# Patient Record
Sex: MALE | Race: WHITE | NOT HISPANIC OR LATINO | Employment: FULL TIME | URBAN - METROPOLITAN AREA
[De-identification: names, ages, dates, MRNs, and addresses within clinical notes are randomized per-mention and may not be internally consistent; named-entity substitution may affect disease eponyms.]

---

## 2023-03-13 ENCOUNTER — TELEPHONE (OUTPATIENT)
Dept: PRIMARY CARE | Facility: CLINIC | Age: 53
End: 2023-03-13
Payer: COMMERCIAL

## 2023-03-13 DIAGNOSIS — F41.9 ANXIETY: Primary | ICD-10-CM

## 2023-03-13 RX ORDER — LORAZEPAM 0.5 MG/1
0.5 TABLET ORAL EVERY 6 HOURS PRN
Qty: 4 TABLET | Refills: 0 | Status: SHIPPED | OUTPATIENT
Start: 2023-03-13 | End: 2023-05-10 | Stop reason: ALTCHOICE

## 2023-03-13 NOTE — TELEPHONE ENCOUNTER
Pt left  stating he is going on airplane.  Has not travelled on airplane recently.  Requesting Rx for fear of flying.  Has taken Ativan in past. Requesting to [facility].

## 2023-05-09 PROBLEM — R49.0 DYSPHONIA: Status: ACTIVE | Noted: 2023-05-09

## 2023-05-09 PROBLEM — K63.5 COLON POLYPS: Status: ACTIVE | Noted: 2023-05-09

## 2023-05-09 PROBLEM — J32.0 CHRONIC MAXILLARY SINUSITIS: Status: ACTIVE | Noted: 2023-05-09

## 2023-05-09 PROBLEM — E78.5 HYPERLIPIDEMIA: Status: ACTIVE | Noted: 2023-05-09

## 2023-05-09 PROBLEM — S43.80XA DETACHMENT OF GLENOID LABRUM: Status: ACTIVE | Noted: 2023-05-09

## 2023-05-09 PROBLEM — S13.9XXA CERVICAL SPRAIN: Status: ACTIVE | Noted: 2023-05-09

## 2023-05-09 PROBLEM — A05.9 FOOD POISONING: Status: ACTIVE | Noted: 2023-05-09

## 2023-05-09 PROBLEM — K59.09 CHRONIC CONSTIPATION: Status: ACTIVE | Noted: 2023-05-09

## 2023-05-09 PROBLEM — J04.0 REFLUX LARYNGITIS: Status: ACTIVE | Noted: 2023-05-09

## 2023-05-09 PROBLEM — Z90.49 S/P CHOLECYSTECTOMY: Status: ACTIVE | Noted: 2023-05-09

## 2023-05-09 PROBLEM — K21.9 REFLUX LARYNGITIS: Status: ACTIVE | Noted: 2023-05-09

## 2023-05-09 PROBLEM — R94.31 ABNORMAL EKG: Status: ACTIVE | Noted: 2023-05-09

## 2023-05-09 PROBLEM — F43.0 ACUTE STRESS REACTION: Status: ACTIVE | Noted: 2023-05-09

## 2023-05-09 PROBLEM — G47.9 SLEEP DIFFICULTIES: Status: ACTIVE | Noted: 2023-05-09

## 2023-05-09 PROBLEM — L25.9 CONTACT DERMATITIS: Status: ACTIVE | Noted: 2023-05-09

## 2023-05-09 PROBLEM — J45.909 RAD (REACTIVE AIRWAY DISEASE) (HHS-HCC): Status: ACTIVE | Noted: 2023-05-09

## 2023-05-09 PROBLEM — R51.9 FREQUENT HEADACHES: Status: ACTIVE | Noted: 2023-05-09

## 2023-05-09 PROBLEM — J38.00 VOCAL CORD WEAKNESS: Status: ACTIVE | Noted: 2023-05-09

## 2023-05-09 PROBLEM — M25.511 BILATERAL SHOULDER PAIN: Status: ACTIVE | Noted: 2023-05-09

## 2023-05-09 PROBLEM — F41.9 ANXIETY: Status: ACTIVE | Noted: 2023-05-09

## 2023-05-09 PROBLEM — K21.9 GASTROESOPHAGEAL REFLUX DISEASE WITHOUT ESOPHAGITIS: Status: ACTIVE | Noted: 2023-05-09

## 2023-05-09 PROBLEM — K29.70 GASTRITIS: Status: ACTIVE | Noted: 2023-05-09

## 2023-05-09 PROBLEM — M25.512 BILATERAL SHOULDER PAIN: Status: ACTIVE | Noted: 2023-05-09

## 2023-05-09 PROBLEM — R44.8 FACIAL PRESSURE: Status: ACTIVE | Noted: 2023-05-09

## 2023-05-09 RX ORDER — ESCITALOPRAM OXALATE 5 MG/1
1 TABLET ORAL DAILY
COMMUNITY
Start: 2022-11-18 | End: 2023-05-10

## 2023-05-09 RX ORDER — PANTOPRAZOLE SODIUM 40 MG/1
FOR SUSPENSION ORAL
COMMUNITY
Start: 2022-04-09 | End: 2023-05-10 | Stop reason: ALTCHOICE

## 2023-05-09 RX ORDER — LEVOCETIRIZINE DIHYDROCHLORIDE 5 MG/1
5 TABLET, FILM COATED ORAL EVERY EVENING
COMMUNITY
End: 2023-05-10 | Stop reason: ALTCHOICE

## 2023-05-09 RX ORDER — OMEPRAZOLE 40 MG/1
CAPSULE, DELAYED RELEASE ORAL
COMMUNITY
Start: 2022-06-27 | End: 2023-05-10 | Stop reason: SDUPTHER

## 2023-05-09 RX ORDER — FLUTICASONE PROPIONATE 50 MCG
2 SPRAY, SUSPENSION (ML) NASAL 2 TIMES DAILY
COMMUNITY
Start: 2020-02-25 | End: 2023-05-10 | Stop reason: SDUPTHER

## 2023-05-09 RX ORDER — FLUTICASONE PROPIONATE AND SALMETEROL 250; 50 UG/1; UG/1
1 POWDER RESPIRATORY (INHALATION) EVERY 12 HOURS
COMMUNITY
Start: 2022-05-19 | End: 2023-05-10 | Stop reason: ALTCHOICE

## 2023-05-10 ENCOUNTER — OFFICE VISIT (OUTPATIENT)
Dept: PRIMARY CARE | Facility: CLINIC | Age: 53
End: 2023-05-10
Payer: COMMERCIAL

## 2023-05-10 VITALS
TEMPERATURE: 98.7 F | BODY MASS INDEX: 26.36 KG/M2 | HEIGHT: 75 IN | DIASTOLIC BLOOD PRESSURE: 80 MMHG | WEIGHT: 212 LBS | RESPIRATION RATE: 14 BRPM | HEART RATE: 78 BPM | OXYGEN SATURATION: 98 % | SYSTOLIC BLOOD PRESSURE: 128 MMHG

## 2023-05-10 DIAGNOSIS — Z00.00 PERIODIC HEALTH ASSESSMENT, GENERAL SCREENING, ADULT: Primary | ICD-10-CM

## 2023-05-10 DIAGNOSIS — J31.0 RHINITIS, UNSPECIFIED TYPE: ICD-10-CM

## 2023-05-10 DIAGNOSIS — K21.9 GASTROESOPHAGEAL REFLUX DISEASE WITHOUT ESOPHAGITIS: ICD-10-CM

## 2023-05-10 PROCEDURE — 99396 PREV VISIT EST AGE 40-64: CPT | Performed by: INTERNAL MEDICINE

## 2023-05-10 PROCEDURE — 1036F TOBACCO NON-USER: CPT | Performed by: INTERNAL MEDICINE

## 2023-05-10 RX ORDER — ESCITALOPRAM OXALATE 5 MG/1
5 TABLET ORAL DAILY
Qty: 90 TABLET | Refills: 3 | Status: CANCELLED | OUTPATIENT
Start: 2023-05-10 | End: 2024-05-09

## 2023-05-10 RX ORDER — OMEPRAZOLE 40 MG/1
40 CAPSULE, DELAYED RELEASE ORAL
Qty: 90 CAPSULE | Refills: 3 | Status: SHIPPED | OUTPATIENT
Start: 2023-05-10 | End: 2023-11-15 | Stop reason: SDUPTHER

## 2023-05-10 RX ORDER — ESCITALOPRAM OXALATE 10 MG/1
10 TABLET ORAL DAILY
Qty: 30 TABLET | Refills: 11 | Status: SHIPPED | OUTPATIENT
Start: 2023-05-10 | End: 2023-06-06

## 2023-05-10 RX ORDER — FLUTICASONE PROPIONATE 50 MCG
2 SPRAY, SUSPENSION (ML) NASAL 2 TIMES DAILY
Qty: 16 G | Refills: 3 | Status: SHIPPED | OUTPATIENT
Start: 2023-05-10 | End: 2023-11-15 | Stop reason: SDUPTHER

## 2023-05-10 ASSESSMENT — ENCOUNTER SYMPTOMS
PALPITATIONS: 0
DIARRHEA: 0
SHORTNESS OF BREATH: 0
COUGH: 0
CONSTIPATION: 0
WHEEZING: 0

## 2023-06-04 DIAGNOSIS — Z00.00 PERIODIC HEALTH ASSESSMENT, GENERAL SCREENING, ADULT: ICD-10-CM

## 2023-06-06 RX ORDER — ESCITALOPRAM OXALATE 10 MG/1
TABLET ORAL
Qty: 30 TABLET | Refills: 11 | Status: SHIPPED | OUTPATIENT
Start: 2023-06-06 | End: 2023-11-15 | Stop reason: ALTCHOICE

## 2023-08-24 ENCOUNTER — OFFICE VISIT (OUTPATIENT)
Dept: PRIMARY CARE | Facility: CLINIC | Age: 53
End: 2023-08-24
Payer: COMMERCIAL

## 2023-08-24 VITALS
SYSTOLIC BLOOD PRESSURE: 118 MMHG | BODY MASS INDEX: 26.62 KG/M2 | WEIGHT: 213 LBS | OXYGEN SATURATION: 96 % | RESPIRATION RATE: 17 BRPM | TEMPERATURE: 98.2 F | HEART RATE: 60 BPM | DIASTOLIC BLOOD PRESSURE: 76 MMHG

## 2023-08-24 DIAGNOSIS — R10.9 ABDOMINAL PAIN IN MALE: Primary | ICD-10-CM

## 2023-08-24 LAB
POC APPEARANCE, URINE: CLEAR
POC BILIRUBIN, URINE: NEGATIVE
POC BLOOD, URINE: ABNORMAL
POC COLOR, URINE: YELLOW
POC GLUCOSE, URINE: NEGATIVE MG/DL
POC KETONES, URINE: NEGATIVE MG/DL
POC LEUKOCYTES, URINE: NEGATIVE
POC NITRITE,URINE: NEGATIVE
POC PH, URINE: 6 PH
POC PROTEIN, URINE: ABNORMAL MG/DL
POC SPECIFIC GRAVITY, URINE: 1.02
POC UROBILINOGEN, URINE: 0.2 EU/DL

## 2023-08-24 PROCEDURE — 99214 OFFICE O/P EST MOD 30 MIN: CPT | Performed by: FAMILY MEDICINE

## 2023-08-24 PROCEDURE — 1036F TOBACCO NON-USER: CPT | Performed by: FAMILY MEDICINE

## 2023-08-24 PROCEDURE — 87086 URINE CULTURE/COLONY COUNT: CPT

## 2023-08-24 PROCEDURE — 81002 URINALYSIS NONAUTO W/O SCOPE: CPT | Performed by: FAMILY MEDICINE

## 2023-08-24 RX ORDER — CIPROFLOXACIN 500 MG/1
500 TABLET ORAL 2 TIMES DAILY
Qty: 20 TABLET | Refills: 0 | Status: SHIPPED | OUTPATIENT
Start: 2023-08-24 | End: 2023-09-03

## 2023-08-24 ASSESSMENT — ENCOUNTER SYMPTOMS
HEMATURIA: 0
SHORTNESS OF BREATH: 0
COUGH: 0
NAUSEA: 1
CONSTIPATION: 0
ABDOMINAL PAIN: 1
VOMITING: 1
FEVER: 0
MYALGIAS: 0
DYSURIA: 0
DIARRHEA: 0
FREQUENCY: 1
FLANK PAIN: 0
WHEEZING: 0

## 2023-08-24 NOTE — PROGRESS NOTES
Subjective   Patient ID: Dario Buckley is a 53 y.o. male who presents for Abdominal Pain.    Abdominal Pain  This is a new problem. The current episode started today. The problem has been unchanged. The pain is located in the suprapubic region and generalized abdominal region. The pain is at a severity of 5/10. Associated symptoms include frequency, nausea and vomiting. Pertinent negatives include no constipation, diarrhea, dysuria, fever, hematuria or myalgias.        Review of Systems   Constitutional:  Negative for fever.   HENT: Negative.     Respiratory:  Negative for cough, shortness of breath and wheezing.    Gastrointestinal:  Positive for abdominal pain, nausea and vomiting. Negative for constipation and diarrhea.   Genitourinary:  Positive for frequency. Negative for dysuria, flank pain, hematuria, scrotal swelling and testicular pain.        Hx of kidney stone   Symptoms today. Lower abd pain    Musculoskeletal:  Negative for myalgias.       Objective   /76 (BP Location: Left arm, Patient Position: Sitting, BP Cuff Size: Large adult)   Pulse 60   Temp 36.8 °C (98.2 °F)   Resp 17   Wt 96.6 kg (213 lb)   SpO2 96%   BMI 26.62 kg/m²     Physical Exam  Vitals and nursing note reviewed.   Constitutional:       General: He is not in acute distress.     Appearance: Normal appearance.   HENT:      Head: Normocephalic and atraumatic.   Cardiovascular:      Rate and Rhythm: Normal rate and regular rhythm.      Heart sounds: Normal heart sounds.   Pulmonary:      Effort: Pulmonary effort is normal.      Breath sounds: Normal breath sounds.   Abdominal:      General: Abdomen is flat. Bowel sounds are normal.      Palpations: Abdomen is soft. There is no mass.      Tenderness: There is abdominal tenderness. There is no right CVA tenderness, left CVA tenderness, guarding or rebound.      Comments: Suprapubic tenderness   Skin:     General: Skin is warm and dry.   Neurological:      Mental Status: He is  alert.         Assessment/Plan   Problem List Items Addressed This Visit       Abdominal pain in male - Primary     Pt with lower abd pain and hx of kidney stones  Ua is abn, will send uc and tx with cipro  Take atbx as directed  Increase flds  Will get renal sonogram asap  F/up with pcp if no improvement         Relevant Medications    ciprofloxacin (Cipro) 500 mg tablet    Other Relevant Orders    POCT UA (nonautomated) manually resulted (Completed)    Urine Culture    US renal complete

## 2023-08-24 NOTE — ASSESSMENT & PLAN NOTE
Pt with lower abd pain and hx of kidney stones  Ua is abn, will send uc and tx with cipro  Take atbx as directed  Increase flds  Will get renal sonogram asap  F/up with pcp if no improvement

## 2023-08-25 LAB — URINE CULTURE: NORMAL

## 2023-10-23 ENCOUNTER — TELEPHONE (OUTPATIENT)
Dept: PRIMARY CARE | Facility: CLINIC | Age: 53
End: 2023-10-23
Payer: COMMERCIAL

## 2023-10-23 DIAGNOSIS — Z12.5 SCREENING FOR PROSTATE CANCER: Primary | ICD-10-CM

## 2023-10-23 DIAGNOSIS — Z00.00 PERIODIC HEALTH ASSESSMENT, GENERAL SCREENING, ADULT: ICD-10-CM

## 2023-11-11 ENCOUNTER — LAB (OUTPATIENT)
Dept: LAB | Facility: LAB | Age: 53
End: 2023-11-11
Payer: COMMERCIAL

## 2023-11-11 DIAGNOSIS — Z00.00 PERIODIC HEALTH ASSESSMENT, GENERAL SCREENING, ADULT: ICD-10-CM

## 2023-11-11 DIAGNOSIS — Z12.5 SCREENING FOR PROSTATE CANCER: ICD-10-CM

## 2023-11-11 LAB
ALBUMIN SERPL BCP-MCNC: 4.3 G/DL (ref 3.4–5)
ALP SERPL-CCNC: 54 U/L (ref 33–120)
ALT SERPL W P-5'-P-CCNC: 51 U/L (ref 10–52)
ANION GAP SERPL CALC-SCNC: 12 MMOL/L (ref 10–20)
AST SERPL W P-5'-P-CCNC: 38 U/L (ref 9–39)
BILIRUB SERPL-MCNC: 1.3 MG/DL (ref 0–1.2)
BUN SERPL-MCNC: 20 MG/DL (ref 6–23)
CALCIUM SERPL-MCNC: 9.4 MG/DL (ref 8.6–10.3)
CHLORIDE SERPL-SCNC: 105 MMOL/L (ref 98–107)
CHOLEST SERPL-MCNC: 269 MG/DL (ref 0–199)
CHOLESTEROL/HDL RATIO: 5.1
CO2 SERPL-SCNC: 29 MMOL/L (ref 21–32)
CREAT SERPL-MCNC: 1.06 MG/DL (ref 0.5–1.3)
ERYTHROCYTE [DISTWIDTH] IN BLOOD BY AUTOMATED COUNT: 13 % (ref 11.5–14.5)
GFR SERPL CREATININE-BSD FRML MDRD: 83 ML/MIN/1.73M*2
GLUCOSE SERPL-MCNC: 91 MG/DL (ref 74–99)
HCT VFR BLD AUTO: 45.1 % (ref 41–52)
HDLC SERPL-MCNC: 52.9 MG/DL
HGB BLD-MCNC: 15.3 G/DL (ref 13.5–17.5)
LDLC SERPL CALC-MCNC: 185 MG/DL
MCH RBC QN AUTO: 33.7 PG (ref 26–34)
MCHC RBC AUTO-ENTMCNC: 33.9 G/DL (ref 32–36)
MCV RBC AUTO: 99 FL (ref 80–100)
NON HDL CHOLESTEROL: 216 MG/DL (ref 0–149)
NRBC BLD-RTO: 0 /100 WBCS (ref 0–0)
PLATELET # BLD AUTO: 202 X10*3/UL (ref 150–450)
POTASSIUM SERPL-SCNC: 4 MMOL/L (ref 3.5–5.3)
PROT SERPL-MCNC: 7.1 G/DL (ref 6.4–8.2)
PSA SERPL-MCNC: 1.49 NG/ML
RBC # BLD AUTO: 4.54 X10*6/UL (ref 4.5–5.9)
SODIUM SERPL-SCNC: 142 MMOL/L (ref 136–145)
TRIGL SERPL-MCNC: 156 MG/DL (ref 0–149)
TSH SERPL-ACNC: 1.19 MIU/L (ref 0.44–3.98)
VLDL: 31 MG/DL (ref 0–40)
WBC # BLD AUTO: 5.8 X10*3/UL (ref 4.4–11.3)

## 2023-11-11 PROCEDURE — 80061 LIPID PANEL: CPT

## 2023-11-11 PROCEDURE — 36415 COLL VENOUS BLD VENIPUNCTURE: CPT

## 2023-11-11 PROCEDURE — 84402 ASSAY OF FREE TESTOSTERONE: CPT

## 2023-11-11 PROCEDURE — 84153 ASSAY OF PSA TOTAL: CPT

## 2023-11-11 PROCEDURE — 85027 COMPLETE CBC AUTOMATED: CPT

## 2023-11-11 PROCEDURE — 80053 COMPREHEN METABOLIC PANEL: CPT

## 2023-11-11 PROCEDURE — 84443 ASSAY THYROID STIM HORMONE: CPT

## 2023-11-15 ENCOUNTER — OFFICE VISIT (OUTPATIENT)
Dept: PRIMARY CARE | Facility: CLINIC | Age: 53
End: 2023-11-15
Payer: COMMERCIAL

## 2023-11-15 VITALS
HEART RATE: 77 BPM | TEMPERATURE: 98.8 F | WEIGHT: 226 LBS | HEIGHT: 75 IN | BODY MASS INDEX: 28.1 KG/M2 | OXYGEN SATURATION: 94 % | RESPIRATION RATE: 14 BRPM | SYSTOLIC BLOOD PRESSURE: 110 MMHG | DIASTOLIC BLOOD PRESSURE: 60 MMHG

## 2023-11-15 DIAGNOSIS — Z00.00 PERIODIC HEALTH ASSESSMENT, GENERAL SCREENING, ADULT: ICD-10-CM

## 2023-11-15 DIAGNOSIS — E78.00 PURE HYPERCHOLESTEROLEMIA: Primary | ICD-10-CM

## 2023-11-15 DIAGNOSIS — F41.9 ANXIETY: ICD-10-CM

## 2023-11-15 DIAGNOSIS — J31.0 RHINITIS, UNSPECIFIED TYPE: ICD-10-CM

## 2023-11-15 DIAGNOSIS — H04.129 DRY EYE: ICD-10-CM

## 2023-11-15 DIAGNOSIS — K21.9 GASTROESOPHAGEAL REFLUX DISEASE WITHOUT ESOPHAGITIS: ICD-10-CM

## 2023-11-15 PROCEDURE — 99213 OFFICE O/P EST LOW 20 MIN: CPT | Performed by: INTERNAL MEDICINE

## 2023-11-15 PROCEDURE — 1036F TOBACCO NON-USER: CPT | Performed by: INTERNAL MEDICINE

## 2023-11-15 RX ORDER — ESCITALOPRAM OXALATE 10 MG/1
10 TABLET ORAL DAILY
Qty: 30 TABLET | Refills: 11 | Status: CANCELLED | OUTPATIENT
Start: 2023-11-15

## 2023-11-15 RX ORDER — EPINASTINE HYDROCHLORIDE 0.5 MG/ML
1 SOLUTION/ DROPS OPHTHALMIC 2 TIMES DAILY
Qty: 15 ML | Refills: 1 | Status: SHIPPED | OUTPATIENT
Start: 2023-11-15 | End: 2024-04-13

## 2023-11-15 RX ORDER — FLUTICASONE PROPIONATE 50 MCG
2 SPRAY, SUSPENSION (ML) NASAL 2 TIMES DAILY
Qty: 48 G | Refills: 1 | Status: SHIPPED | OUTPATIENT
Start: 2023-11-15 | End: 2024-05-09

## 2023-11-15 RX ORDER — FLUTICASONE PROPIONATE 50 MCG
2 SPRAY, SUSPENSION (ML) NASAL 2 TIMES DAILY
Qty: 16 G | Refills: 3 | Status: CANCELLED | OUTPATIENT
Start: 2023-11-15 | End: 2024-11-14

## 2023-11-15 RX ORDER — OMEPRAZOLE 40 MG/1
40 CAPSULE, DELAYED RELEASE ORAL
Qty: 90 CAPSULE | Refills: 3 | Status: SHIPPED | OUTPATIENT
Start: 2023-11-15 | End: 2024-04-29 | Stop reason: SDUPTHER

## 2023-11-15 RX ORDER — ESCITALOPRAM OXALATE 5 MG/1
5 TABLET ORAL DAILY
Qty: 30 TABLET | Refills: 1 | Status: SHIPPED | OUTPATIENT
Start: 2023-11-15 | End: 2024-04-16 | Stop reason: WASHOUT

## 2023-11-15 ASSESSMENT — ENCOUNTER SYMPTOMS
WHEEZING: 0
SHORTNESS OF BREATH: 0
ABDOMINAL PAIN: 0
PALPITATIONS: 0
CONSTIPATION: 0
DIARRHEA: 0
COUGH: 0

## 2023-11-15 NOTE — PROGRESS NOTES
"Subjective   Patient ID: Dario Buckley is a 53 y.o. male who presents for Anxiety.    Overall he has been doing well.  Trying to balance life with school and family etc.  No major issues.  Sleeping ok.    Also some allergy and dry eye symptoms.      Review of Systems   Respiratory:  Negative for cough, shortness of breath and wheezing.    Cardiovascular:  Negative for chest pain and palpitations.   Gastrointestinal:  Negative for abdominal pain, constipation and diarrhea.       Objective   /60 (BP Location: Right arm, Patient Position: Sitting, BP Cuff Size: Adult)   Pulse 77   Temp 37.1 °C (98.8 °F) (Tympanic)   Resp 14   Ht 1.905 m (6' 3\")   Wt 103 kg (226 lb)   SpO2 94%   BMI 28.25 kg/m²     Physical Exam  Vitals reviewed.   Constitutional:       Appearance: Normal appearance.   HENT:      Head: Normocephalic.   Eyes:      Extraocular Movements: Extraocular movements intact.      Conjunctiva/sclera: Conjunctivae normal.      Pupils: Pupils are equal, round, and reactive to light.   Cardiovascular:      Rate and Rhythm: Normal rate.   Pulmonary:      Effort: Pulmonary effort is normal.   Musculoskeletal:         General: Normal range of motion.   Neurological:      General: No focal deficit present.      Mental Status: He is alert.   Psychiatric:         Mood and Affect: Mood normal.         Assessment/Plan   Problem List Items Addressed This Visit             ICD-10-CM    Anxiety F41.9    Relevant Medications    escitalopram (Lexapro) 5 mg tablet    Gastroesophageal reflux disease without esophagitis K21.9    Relevant Medications    omeprazole (PriLOSEC) 40 mg DR capsule    Pure hypercholesterolemia - Primary E78.00     Other Visit Diagnoses         Codes    Periodic health assessment, general screening, adult     Z00.00    Rhinitis, unspecified type     J31.0    Relevant Medications    fluticasone (Flonase) 50 mcg/actuation nasal spray    Dry eye     H04.129    Relevant Medications    epinastine " (Elestat) 0.05 % ophthalmic solution        We reviewed and discussed all of the above.  All questions answered.    We discussed medications as well as importance of healthy diet and regular exercise.    Follow up in 6 months - sooner if any issues or concerns.

## 2023-11-16 LAB
TESTOSTERONE FREE (CHAN): 88.2 PG/ML (ref 35–155)
TESTOSTERONE,TOTAL,LC-MS/MS: 673 NG/DL (ref 250–1100)

## 2024-02-26 ENCOUNTER — OFFICE VISIT (OUTPATIENT)
Dept: PRIMARY CARE | Facility: CLINIC | Age: 54
End: 2024-02-26
Payer: COMMERCIAL

## 2024-02-26 VITALS
DIASTOLIC BLOOD PRESSURE: 77 MMHG | OXYGEN SATURATION: 94 % | BODY MASS INDEX: 30.09 KG/M2 | WEIGHT: 242 LBS | HEART RATE: 79 BPM | HEIGHT: 75 IN | TEMPERATURE: 98 F | RESPIRATION RATE: 18 BRPM | SYSTOLIC BLOOD PRESSURE: 118 MMHG

## 2024-02-26 DIAGNOSIS — H61.21 IMPACTED CERUMEN, RIGHT EAR: ICD-10-CM

## 2024-02-26 DIAGNOSIS — J02.9 SORE THROAT: Primary | ICD-10-CM

## 2024-02-26 PROBLEM — R44.8 FACIAL PRESSURE: Status: RESOLVED | Noted: 2023-05-09 | Resolved: 2024-02-26

## 2024-02-26 PROBLEM — L25.9 CONTACT DERMATITIS: Status: RESOLVED | Noted: 2023-05-09 | Resolved: 2024-02-26

## 2024-02-26 PROBLEM — R10.9 ABDOMINAL PAIN IN MALE: Status: RESOLVED | Noted: 2023-08-24 | Resolved: 2024-02-26

## 2024-02-26 PROBLEM — F43.0 ACUTE STRESS REACTION: Status: RESOLVED | Noted: 2023-05-09 | Resolved: 2024-02-26

## 2024-02-26 PROBLEM — R49.0 DYSPHONIA: Status: RESOLVED | Noted: 2023-05-09 | Resolved: 2024-02-26

## 2024-02-26 PROBLEM — A05.9 FOOD POISONING: Status: RESOLVED | Noted: 2023-05-09 | Resolved: 2024-02-26

## 2024-02-26 PROBLEM — S13.9XXA CERVICAL SPRAIN: Status: RESOLVED | Noted: 2023-05-09 | Resolved: 2024-02-26

## 2024-02-26 LAB — POC RAPID STREP: NEGATIVE

## 2024-02-26 PROCEDURE — 99213 OFFICE O/P EST LOW 20 MIN: CPT

## 2024-02-26 PROCEDURE — 87081 CULTURE SCREEN ONLY: CPT

## 2024-02-26 PROCEDURE — 87880 STREP A ASSAY W/OPTIC: CPT

## 2024-02-26 PROCEDURE — 1036F TOBACCO NON-USER: CPT

## 2024-02-26 ASSESSMENT — ENCOUNTER SYMPTOMS
CHEST TIGHTNESS: 0
SHORTNESS OF BREATH: 0
SORE THROAT: 1
CHILLS: 0
FEVER: 0
PALPITATIONS: 0
RHINORRHEA: 0
COUGH: 0

## 2024-02-26 NOTE — PATIENT INSTRUCTIONS
Motrin 600mg 3 times a day.    Cough drops, vicks, menthol flavor to help with sore throat.  Try hot water and honey.    Will call in 2 days.

## 2024-02-26 NOTE — PROGRESS NOTES
"Subjective   Dario Buckley is a 53 y.o. male who presents for Sore Throat (Sore throat, throat feels raw /Son had strep last week.).    KANDI Bishop is here for 2-3 days of sore throat.     He is a  and son had strep last week.  Son was diagnosed at urgent care. Now with new viral infection.    He is using nasal sprays, nasal rinses. No tylenol or motrin.  Took old amoxicillin, 2 doses.    Tonsillectomy 10 years ago.  Did not take covid test.  No sick symptoms, fever/ chills, ear pain.    Review of Systems   Constitutional:  Negative for chills and fever.   HENT:  Positive for sore throat. Negative for congestion, ear pain and rhinorrhea.    Respiratory:  Negative for cough, chest tightness and shortness of breath.    Cardiovascular:  Negative for chest pain, palpitations and leg swelling.     Objective     /77 (BP Location: Right arm, Patient Position: Sitting)   Pulse 79   Temp 36.7 °C (98 °F)   Resp 18   Ht 1.905 m (6' 3\")   Wt 110 kg (242 lb)   SpO2 94%   BMI 30.25 kg/m²       Physical Exam  Vitals reviewed.   Constitutional:       General: He is not in acute distress.     Appearance: Normal appearance.   HENT:      Head: Normocephalic.      Right Ear: There is impacted cerumen.      Left Ear: Tympanic membrane, ear canal and external ear normal.      Mouth/Throat:      Mouth: Mucous membranes are moist.      Pharynx: Oropharynx is clear. Posterior oropharyngeal erythema (mild) present. No oropharyngeal exudate.   Cardiovascular:      Rate and Rhythm: Normal rate and regular rhythm.      Pulses: Normal pulses.      Heart sounds: Normal heart sounds. No murmur heard.  Pulmonary:      Effort: Pulmonary effort is normal.      Breath sounds: Normal breath sounds. No wheezing, rhonchi or rales.   Musculoskeletal:      Cervical back: Normal range of motion and neck supple.   Lymphadenopathy:      Cervical: No cervical adenopathy.   Skin:     General: Skin is warm and dry.   Neurological:     " Returned pt's phone call regarding an appt question   Mental Status: He is alert.   Psychiatric:         Mood and Affect: Mood normal.       Assessment/Plan   Problem List Items Addressed This Visit    None  Visit Diagnoses         Codes    Sore throat    -  Primary J02.9    Relevant Orders    POCT Rapid Strep A manually resulted (Completed)    Group A Streptococcus, Culture    Impacted cerumen, right ear     H61.21    Relevant Orders    Ear cerumen removal        Dario is here for sore throat. He is not ill appearing, NAD. Pharyngitis: bacterial vs viral. POC strep negative. Back up sent. Symptoms likely viral at this time. Disc symptomatic care, oral hydration.  Impacted right ear: cleaned out today. Recommend not using Qtips for ears.    Discussed with Attending,    Kerrie Vazquez, DO PGY-3

## 2024-02-27 NOTE — PROGRESS NOTES
I reviewed the resident/fellow's documentation and discussed the patient with the resident. I agree with the resident medical decision making as documented in the note.   Patient has had a sore throat for 2 to 3 days.  Minor URI-like symptoms.  His son has URI but he did have strep earlier this month.  Per the resident no deviation, patient had his tonsils removed.  Rapid strep test was negative.  Will send for strep culture.  He is do symptomatic relief.  Patient tolerated the procedure for ear wax removal.  TM was normal per resident posttreatment.  Patient aware if any fever chills any chest pain shortness of breath or trouble swallowing any worsening symptoms any rash, any ear pain decreased hearing any concerning symptoms notify office to go to the ER  Follow-up in 2 weeks if needed  Agree with assessment and plan  Enoc Piper, DO

## 2024-02-29 LAB — S PYO THROAT QL CULT: NORMAL

## 2024-04-16 ENCOUNTER — OFFICE VISIT (OUTPATIENT)
Dept: PRIMARY CARE | Facility: CLINIC | Age: 54
End: 2024-04-16
Payer: COMMERCIAL

## 2024-04-16 VITALS
RESPIRATION RATE: 20 BRPM | BODY MASS INDEX: 29.87 KG/M2 | WEIGHT: 239 LBS | OXYGEN SATURATION: 97 % | HEART RATE: 88 BPM | DIASTOLIC BLOOD PRESSURE: 82 MMHG | TEMPERATURE: 97.8 F | SYSTOLIC BLOOD PRESSURE: 124 MMHG

## 2024-04-16 DIAGNOSIS — J06.9 UPPER RESPIRATORY TRACT INFECTION, UNSPECIFIED TYPE: Primary | ICD-10-CM

## 2024-04-16 LAB
POC RAPID STREP: NEGATIVE
POC SARS-COV-2 AG BINAX: NORMAL

## 2024-04-16 PROCEDURE — 87811 SARS-COV-2 COVID19 W/OPTIC: CPT | Performed by: NURSE PRACTITIONER

## 2024-04-16 PROCEDURE — 1036F TOBACCO NON-USER: CPT | Performed by: NURSE PRACTITIONER

## 2024-04-16 PROCEDURE — 87651 STREP A DNA AMP PROBE: CPT

## 2024-04-16 PROCEDURE — 99213 OFFICE O/P EST LOW 20 MIN: CPT | Performed by: NURSE PRACTITIONER

## 2024-04-16 PROCEDURE — 87636 SARSCOV2 & INF A&B AMP PRB: CPT

## 2024-04-16 PROCEDURE — 87880 STREP A ASSAY W/OPTIC: CPT | Performed by: NURSE PRACTITIONER

## 2024-04-16 RX ORDER — ALBUTEROL SULFATE 90 UG/1
2 AEROSOL, METERED RESPIRATORY (INHALATION) EVERY 4 HOURS PRN
Qty: 8.5 G | Refills: 0 | Status: SHIPPED | OUTPATIENT
Start: 2024-04-16 | End: 2024-05-16

## 2024-04-16 RX ORDER — METHYLPREDNISOLONE 4 MG/1
TABLET ORAL
Qty: 21 TABLET | Refills: 0 | Status: SHIPPED | OUTPATIENT
Start: 2024-04-16 | End: 2024-04-23

## 2024-04-16 RX ORDER — AMOXICILLIN AND CLAVULANATE POTASSIUM 875; 125 MG/1; MG/1
875 TABLET, FILM COATED ORAL 2 TIMES DAILY
Qty: 20 TABLET | Refills: 0 | Status: SHIPPED | OUTPATIENT
Start: 2024-04-16 | End: 2024-04-26

## 2024-04-16 ASSESSMENT — ENCOUNTER SYMPTOMS
VOMITING: 0
DIARRHEA: 0
FEVER: 1
NAUSEA: 0
RHINORRHEA: 1
HEADACHES: 1
WHEEZING: 0
SORE THROAT: 1
FATIGUE: 0
ABDOMINAL PAIN: 0
CHILLS: 1
SHORTNESS OF BREATH: 0
COUGH: 1
MYALGIAS: 1
APPETITE CHANGE: 0

## 2024-04-16 NOTE — PROGRESS NOTES
Subjective   Patient ID: Dario Buckley is a 53 y.o. male who presents for Sore Throat.    Patient's symptoms started on Sunday with congestion, headache and sore throat. Pt had the chills. Patient tried tylenol and it did not really help. Pt is vaccinated against COVID with the most recent vaccine. 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     Review of Systems   Constitutional:  Positive for chills and fever (99.8). Negative for appetite change and fatigue.   HENT:  Positive for congestion, postnasal drip, rhinorrhea, sneezing and sore throat.    Respiratory:  Positive for cough. Negative for shortness of breath and wheezing.    Cardiovascular:  Negative for chest pain.   Gastrointestinal:  Negative for abdominal pain, diarrhea, nausea and vomiting.   Musculoskeletal:  Positive for myalgias.   Neurological:  Positive for headaches.     Objective   /82   Pulse 88   Temp 36.6 °C (97.8 °F) (Temporal)   Resp 20   Wt 108 kg (239 lb)   SpO2 97%   BMI 29.87 kg/m²     Physical Exam  Vitals reviewed.   Constitutional:       General: He is not in acute distress.     Appearance: Normal appearance. He is not toxic-appearing.   HENT:      Head: Atraumatic.      Right Ear: Tympanic membrane, ear canal and external ear normal.      Left Ear: Tympanic membrane, ear canal and external ear normal.      Nose: Congestion and rhinorrhea present.      Mouth/Throat:      Pharynx: Posterior oropharyngeal erythema present. No oropharyngeal exudate.      Comments: Tonsils surgically absent, uvula midline  Eyes:      Conjunctiva/sclera: Conjunctivae normal.   Cardiovascular:      Rate and Rhythm: Normal rate and regular rhythm.      Heart sounds: Normal heart sounds. No murmur heard.  Pulmonary:      Effort: Pulmonary effort is normal.      Breath sounds: Normal breath sounds. No wheezing or rhonchi.      Comments: Dry cough noted  Musculoskeletal:         General: Normal range of motion.   Lymphadenopathy:       Cervical: Cervical adenopathy present.   Skin:     General: Skin is warm and dry.   Neurological:      General: No focal deficit present.      Mental Status: He is alert.     Assessment/Plan   Problem List Items Addressed This Visit    None  Visit Diagnoses         Codes    Upper respiratory tract infection, unspecified type    -  Primary J06.9    Relevant Medications    albuterol (ProAir HFA) 90 mcg/actuation inhaler    methylPREDNISolone (Medrol Dospak) 4 mg tablets    amoxicillin-pot clavulanate (Augmentin) 875-125 mg tablet    Other Relevant Orders    Group A Streptococcus, PCR    POCT rapid strep A manually resulted (Completed)    POCT BinaxNOW Covid-19 Ag Card manually resulted (Completed)    Sars-CoV-2 and Influenza A/B PCR          Rapid strep negative in the office. will get back up strep testing. Rapid covid is negative. Will get PCR COVID/flu tests. Pt started on inhaler and medrol estefany. Pt to use inhaler every four to six hours as needed. Pt to start on medrol estefany. Pt to avoid use of other anti-inflammatories while on the steroids; he agreed. Advised pt on use of humidifier and hot steam treatments. Discussed that patient is to drink plenty of fluids and stay well hydrated. Can take tylenol every four to six hours as needed for any fevers or discomfort. Discussed that patient is to go to the ER for any decreased fluid intake/urine output, difficulty breathing, shortness of breath or new/concerning symptoms; he agreed. Will call patient when results become available. Pt is to self quarantine until feeling better, results become available and until he is without a fever (should one develop) for at least 24 hours without the use of tylenol; he agreed. pt to follow up in 2-3 days if symptoms are not improving.

## 2024-04-17 ENCOUNTER — TELEPHONE (OUTPATIENT)
Dept: PRIMARY CARE | Facility: CLINIC | Age: 54
End: 2024-04-17
Payer: COMMERCIAL

## 2024-04-17 LAB
FLUAV RNA RESP QL NAA+PROBE: NOT DETECTED
FLUBV RNA RESP QL NAA+PROBE: NOT DETECTED
S PYO DNA THROAT QL NAA+PROBE: NOT DETECTED
SARS-COV-2 ORF1AB RESP QL NAA+PROBE: NOT DETECTED

## 2024-04-17 NOTE — TELEPHONE ENCOUNTER
Spoke to patient and he saw his negative flu results. Pt has started the antibiotics.     ----- Message from Gerry Hughes CMA sent at 4/17/2024 11:27 AM EDT -----  Regarding: FW: Flu  Contact: [phone]    ----- Message -----  From: Dario Buckley  Sent: 4/17/2024   7:35 AM EDT  To: Do Paez [place] [address] Clinical Support Staff  Subject: Flu                                              Silvia Chacko,    Thank you for visiting with me yesterday in your office. I saw my test results came back negative. I did not see a flu result. Is that going to be delivered later?   Thanks,  Dario Buckley

## 2024-04-29 ENCOUNTER — TELEPHONE (OUTPATIENT)
Dept: PRIMARY CARE | Facility: CLINIC | Age: 54
End: 2024-04-29
Payer: COMMERCIAL

## 2024-04-29 DIAGNOSIS — K21.9 GASTROESOPHAGEAL REFLUX DISEASE WITHOUT ESOPHAGITIS: ICD-10-CM

## 2024-04-29 RX ORDER — OMEPRAZOLE 40 MG/1
40 CAPSULE, DELAYED RELEASE ORAL
Qty: 90 CAPSULE | Refills: 3 | Status: SHIPPED | OUTPATIENT
Start: 2024-04-29 | End: 2024-05-07 | Stop reason: SDUPTHER

## 2024-05-07 ENCOUNTER — TELEPHONE (OUTPATIENT)
Dept: PEDIATRICS | Facility: CLINIC | Age: 54
End: 2024-05-07
Payer: COMMERCIAL

## 2024-05-07 DIAGNOSIS — K21.9 GASTROESOPHAGEAL REFLUX DISEASE WITHOUT ESOPHAGITIS: ICD-10-CM

## 2024-05-07 DIAGNOSIS — H04.129 DRY EYE: Primary | ICD-10-CM

## 2024-05-07 RX ORDER — OMEPRAZOLE 40 MG/1
40 CAPSULE, DELAYED RELEASE ORAL
Qty: 90 CAPSULE | Refills: 3 | Status: SHIPPED | OUTPATIENT
Start: 2024-05-07 | End: 2025-05-07

## 2024-05-07 RX ORDER — EPINASTINE HYDROCHLORIDE 0.5 MG/ML
1 SOLUTION/ DROPS OPHTHALMIC 2 TIMES DAILY
Qty: 5 ML | Refills: 2 | Status: SHIPPED | OUTPATIENT
Start: 2024-05-07

## 2024-05-07 NOTE — TELEPHONE ENCOUNTER
Rx Refill Request Telephone Encounter    Name:  Dario VILLASENOR Agapitojessica  :  [phone]  Medication Name:  epinastine (Elestat) 0.05 % ophthalmic solution   Specific Pharmacy location:  [facility]/pharmacy #[id number] [place] [id number] [facility]   Date of last appointment:  24  Date of next appointment:  24  Best number to reach patient:  [phone]

## 2024-05-07 NOTE — TELEPHONE ENCOUNTER
Scheduled for follow up in August   Requesting two refills.   Omeprazole  Rx Dry eye gtts (epinastine gtts)

## 2024-05-09 DIAGNOSIS — J31.0 RHINITIS, UNSPECIFIED TYPE: ICD-10-CM

## 2024-05-09 RX ORDER — FLUTICASONE PROPIONATE 50 MCG
2 SPRAY, SUSPENSION (ML) NASAL 2 TIMES DAILY
Qty: 48 ML | Refills: 1 | Status: SHIPPED | OUTPATIENT
Start: 2024-05-09 | End: 2025-05-09

## 2024-05-24 ENCOUNTER — APPOINTMENT (OUTPATIENT)
Dept: PRIMARY CARE | Facility: CLINIC | Age: 54
End: 2024-05-24
Payer: COMMERCIAL

## 2024-07-08 ENCOUNTER — OFFICE VISIT (OUTPATIENT)
Dept: PRIMARY CARE | Facility: CLINIC | Age: 54
End: 2024-07-08
Payer: COMMERCIAL

## 2024-07-08 VITALS
HEART RATE: 79 BPM | SYSTOLIC BLOOD PRESSURE: 117 MMHG | BODY MASS INDEX: 29.75 KG/M2 | WEIGHT: 238 LBS | OXYGEN SATURATION: 97 % | DIASTOLIC BLOOD PRESSURE: 90 MMHG | RESPIRATION RATE: 20 BRPM | TEMPERATURE: 100.2 F

## 2024-07-08 DIAGNOSIS — H04.129 DRY EYE: ICD-10-CM

## 2024-07-08 DIAGNOSIS — R50.9 FEVER, UNSPECIFIED FEVER CAUSE: Primary | ICD-10-CM

## 2024-07-08 DIAGNOSIS — U07.1 COVID-19: ICD-10-CM

## 2024-07-08 DIAGNOSIS — J06.9 UPPER RESPIRATORY TRACT INFECTION, UNSPECIFIED TYPE: ICD-10-CM

## 2024-07-08 LAB
POC RAPID STREP: NEGATIVE
POC SARS-COV-2 AG BINAX: ABNORMAL

## 2024-07-08 PROCEDURE — 87880 STREP A ASSAY W/OPTIC: CPT | Performed by: FAMILY MEDICINE

## 2024-07-08 PROCEDURE — 1036F TOBACCO NON-USER: CPT | Performed by: FAMILY MEDICINE

## 2024-07-08 PROCEDURE — 87651 STREP A DNA AMP PROBE: CPT

## 2024-07-08 PROCEDURE — 99214 OFFICE O/P EST MOD 30 MIN: CPT | Performed by: FAMILY MEDICINE

## 2024-07-08 PROCEDURE — 87811 SARS-COV-2 COVID19 W/OPTIC: CPT | Performed by: FAMILY MEDICINE

## 2024-07-08 RX ORDER — ALBUTEROL SULFATE 90 UG/1
2 AEROSOL, METERED RESPIRATORY (INHALATION) EVERY 4 HOURS PRN
Qty: 8.5 G | Refills: 0 | Status: SHIPPED | OUTPATIENT
Start: 2024-07-08 | End: 2024-08-07

## 2024-07-08 RX ORDER — EPINASTINE HYDROCHLORIDE 0.5 MG/ML
1 SOLUTION/ DROPS OPHTHALMIC 2 TIMES DAILY
Qty: 5 ML | Refills: 2 | Status: SHIPPED | OUTPATIENT
Start: 2024-07-08

## 2024-07-08 ASSESSMENT — ENCOUNTER SYMPTOMS
SORE THROAT: 1
SHORTNESS OF BREATH: 0
DIFFICULTY URINATING: 0
DIARRHEA: 0
FATIGUE: 1
COUGH: 1
WHEEZING: 0
MYALGIAS: 1
NAUSEA: 0
HEADACHES: 1
ABDOMINAL PAIN: 0
FEVER: 1
VOMITING: 0

## 2024-07-08 NOTE — LETTER
July 8, 2024    Patient:           Dario Buckley  YOB: 1970    From [facility]:    Return to work note PATIENTS WITH SUSPECTED or CONFIRMED COVID-19    Dario Buckley  Date of Evaluation: 7/8/24    Centers for Disease Control and Prevention (CDC) Criteria to discontinue home isolation and return to work:    FIVE DAYS SINCE SYMPTOMS STARTED    AND    ONE DAY of NO FEVER without the use of fever-reducing medicines like acetaminophen (Tylenol) or Ibuprofen (Motrin or Advil).    AND    Either WITHOUT SYMPTOMS or WITH IMPROVING SYMPTOMS    Dario must continue to wear a mask around other people for a full 10 days, even if allowed to return to work.    The earliest Dario can return to work is 7/10/2024.    If you are able to test Dario with antigen tests, and Dario has two sequential NEGATIVE tests 48 hours apart, then Dario may remove mask before day 10.     CDC recommends an isolation period of at least 10 days for people who cannot return to work after 5 days and up to 20 days for people who are severely ill with COVID-19 and for the people with weakened immune systems. Consult with your healthcare provider about when you can resume being around other people.    Please check the CDC website for the latest information as the criteria for home quarantine and guidelines for home isolation are changing frequently:     https://www.cdc.gov/coronavirus/2019-ncov/your-health/isolation.html     Charlee Carroll CMA

## 2024-07-08 NOTE — ASSESSMENT & PLAN NOTE
Pt d 3/5 of quarantine period  Then wear mask for another 5 d  Take paxlovid as directed  Use inhaler as needed  F/up if no improvement  Go to ER if any resp distress

## 2024-07-08 NOTE — PROGRESS NOTES
Subjective   Patient ID: Dario Buckley is a 54 y.o. male who presents for Fever (Body aches/Needs refills for eye drops and inhaler  I can send to [facility]).    Fever   This is a new problem. Episode onset: Friday. The problem has been gradually worsening. His temperature was unmeasured prior to arrival. Associated symptoms include congestion, coughing, headaches and a sore throat. Pertinent negatives include no abdominal pain, chest pain, diarrhea, ear pain, nausea, vomiting or wheezing. He has tried acetaminophen (mucinex) for the symptoms. The treatment provided no relief.   Risk factors: recent travel         Review of Systems   Constitutional:  Positive for fatigue and fever.        Symptoms x 3 d  [place] came [place] from [place]  Mom is ill also   HENT:  Positive for congestion and sore throat. Negative for ear pain.    Respiratory:  Positive for cough. Negative for shortness of breath and wheezing.    Cardiovascular:  Negative for chest pain.   Gastrointestinal:  Negative for abdominal pain, diarrhea, nausea and vomiting.   Genitourinary:  Negative for difficulty urinating.   Musculoskeletal:  Positive for myalgias.   Neurological:  Positive for headaches.       Objective   /90   Pulse 79   Temp 37.9 °C (100.2 °F)   Resp 20   Wt 108 kg (238 lb)   SpO2 97%   BMI 29.75 kg/m²     Physical Exam  Vitals and nursing note reviewed.   Constitutional:       General: He is not in acute distress.     Appearance: Normal appearance.   HENT:      Head: Normocephalic and atraumatic.      Right Ear: Tympanic membrane, ear canal and external ear normal.      Left Ear: Tympanic membrane, ear canal and external ear normal.      Nose: Nose normal.      Mouth/Throat:      Mouth: Mucous membranes are moist.      Pharynx: Oropharynx is clear.   Cardiovascular:      Rate and Rhythm: Normal rate and regular rhythm.      Heart sounds: Normal heart sounds.   Pulmonary:      Effort: Pulmonary effort is  normal.      Breath sounds: Normal breath sounds.   Musculoskeletal:      Cervical back: Neck supple.   Lymphadenopathy:      Cervical: No cervical adenopathy.   Skin:     General: Skin is warm and dry.   Neurological:      Mental Status: He is alert.         Assessment/Plan   Problem List Items Addressed This Visit             ICD-10-CM    Fever - Primary R50.9    Relevant Orders    POCT BinaxNOW Covid-19 Ag Card manually resulted (Completed)    POCT rapid strep A manually resulted (Completed)    Group A Streptococcus, PCR    COVID-19 U07.1     Pt d 3/5 of quarantine period  Then wear mask for another 5 d  Take paxlovid as directed  Use inhaler as needed  F/up if no improvement  Go to ER if any resp distress         Relevant Medications    nirmatrelvir-ritonavir (PAXLOVID) 300 mg (150 mg x 2)-100 mg tablet therapy pack

## 2024-07-09 LAB — S PYO DNA THROAT QL NAA+PROBE: NOT DETECTED

## 2024-07-22 ENCOUNTER — OFFICE VISIT (OUTPATIENT)
Dept: PRIMARY CARE | Facility: CLINIC | Age: 54
End: 2024-07-22
Payer: COMMERCIAL

## 2024-07-22 VITALS
OXYGEN SATURATION: 96 % | DIASTOLIC BLOOD PRESSURE: 71 MMHG | HEART RATE: 86 BPM | SYSTOLIC BLOOD PRESSURE: 115 MMHG | WEIGHT: 240 LBS | RESPIRATION RATE: 18 BRPM | TEMPERATURE: 98.3 F | BODY MASS INDEX: 30 KG/M2

## 2024-07-22 DIAGNOSIS — H65.93 OTITIS MEDIA WITH EFFUSION, BILATERAL: Primary | ICD-10-CM

## 2024-07-22 DIAGNOSIS — H69.93 DYSFUNCTION OF BOTH EUSTACHIAN TUBES: ICD-10-CM

## 2024-07-22 PROCEDURE — 99213 OFFICE O/P EST LOW 20 MIN: CPT

## 2024-07-22 PROCEDURE — 1036F TOBACCO NON-USER: CPT

## 2024-07-22 RX ORDER — MINERAL OIL
180 ENEMA (ML) RECTAL DAILY
Qty: 30 TABLET | Refills: 0 | Status: SHIPPED | OUTPATIENT
Start: 2024-07-22 | End: 2024-07-25 | Stop reason: SDUPTHER

## 2024-07-22 RX ORDER — OXYMETAZOLINE HYDROCHLORIDE 0.05 G/100ML
2 SPRAY, METERED NASAL EVERY 12 HOURS PRN
Qty: 30 ML | Refills: 0 | Status: SHIPPED | OUTPATIENT
Start: 2024-07-22 | End: 2024-07-25 | Stop reason: SDUPTHER

## 2024-07-22 ASSESSMENT — ENCOUNTER SYMPTOMS
CHEST TIGHTNESS: 0
POLYDIPSIA: 0
EYE REDNESS: 0
ARTHRALGIAS: 0
POLYPHAGIA: 0
NAUSEA: 0
DIZZINESS: 0
DIARRHEA: 0
FATIGUE: 0
CHOKING: 0
FEVER: 0
EYE ITCHING: 0
SEIZURES: 0
VOMITING: 0
CHILLS: 0
SLEEP DISTURBANCE: 0
DIFFICULTY URINATING: 0
AGITATION: 0
HEADACHES: 0
SORE THROAT: 0
APPETITE CHANGE: 0
STRIDOR: 0
FACIAL ASYMMETRY: 0
PALPITATIONS: 0
RHINORRHEA: 0
BACK PAIN: 0
EYE DISCHARGE: 0
EYE PAIN: 0
ABDOMINAL DISTENTION: 0
MYALGIAS: 0
LIGHT-HEADEDNESS: 0
FREQUENCY: 0
NUMBNESS: 0
SINUS PAIN: 0
DYSURIA: 0
SHORTNESS OF BREATH: 0
WOUND: 0
COUGH: 0
ACTIVITY CHANGE: 0
ABDOMINAL PAIN: 0
WHEEZING: 0
CONSTIPATION: 0
SINUS PRESSURE: 0

## 2024-07-22 NOTE — PROGRESS NOTES
I reviewed the resident/fellow's documentation and discussed the patient with the resident. I agree with the resident medical decision making as documented in the note.   Patient is post COVID.  He still has ear pressure.  No tenderness on exam.  There is fluid behind the ears but no erythema.  Will try symptomatic relief first.  Side effects of medication explained to the patient.  If it continues may need to see ENT or start antibiotic.  If any increase symptoms any decreased hearing, fever chills pain worsening symptoms notify the office or go to the ER  Follow-up in 1 week  Agree with assessment plan  Enoc Piper, DO

## 2024-07-22 NOTE — PROGRESS NOTES
Subjective   Patient ID: [id number] 1970   Dario Buckley is a 54 y.o. male who presents for Ear Fullness (Ear fullness in both ears x 5 days).    Patient states that he was sick with COVID about 2 weeks ago.  He started to feel better but then developed bilateral ear pressure with decreased hearing for the past week.   He denies fever, sore throat, or decreased appetite.  He denies any history of otitis media.  He has been taking Flonase daily.      Review of Systems   Constitutional:  Negative for activity change, appetite change, chills, fatigue and fever.   HENT:  Positive for ear pain and hearing loss. Negative for congestion, dental problem, mouth sores, postnasal drip, rhinorrhea, sinus pressure, sinus pain, sneezing and sore throat.    Eyes:  Negative for pain, discharge, redness and itching.   Respiratory:  Negative for cough, choking, chest tightness, shortness of breath, wheezing and stridor.    Cardiovascular:  Negative for chest pain, palpitations and leg swelling.   Gastrointestinal:  Negative for abdominal distention, abdominal pain, constipation, diarrhea, nausea and vomiting.   Endocrine: Negative for polydipsia, polyphagia and polyuria.   Genitourinary:  Negative for decreased urine volume, difficulty urinating, dysuria, enuresis, frequency and urgency.   Musculoskeletal:  Negative for arthralgias, back pain and myalgias.   Skin:  Negative for pallor, rash and wound.   Neurological:  Negative for dizziness, seizures, syncope, facial asymmetry, light-headedness, numbness and headaches.   Psychiatric/Behavioral:  Negative for agitation, behavioral problems, sleep disturbance and suicidal ideas.        Objective   /71 (BP Location: Right arm, Patient Position: Sitting)   Pulse 86   Temp 36.8 °C (98.3 °F)   Resp 18   Wt 109 kg (240 lb)   SpO2 96%   BMI 30.00 kg/m²    Physical Exam  Vitals and nursing note reviewed.   Constitutional:       General: He is not in acute distress.      Appearance: Normal appearance. He is not toxic-appearing.   HENT:      Head: Normocephalic and atraumatic.      Right Ear: Ear canal and external ear normal. No tenderness. A middle ear effusion is present. There is no impacted cerumen. Tympanic membrane is injected. Tympanic membrane is not erythematous or bulging.      Left Ear: Ear canal and external ear normal. No tenderness. A middle ear effusion is present. There is no impacted cerumen. Tympanic membrane is not bulging.      Nose: Nose normal.      Mouth/Throat:      Mouth: Mucous membranes are moist.      Pharynx: No oropharyngeal exudate or posterior oropharyngeal erythema.   Eyes:      General: No scleral icterus.     Extraocular Movements: Extraocular movements intact.      Pupils: Pupils are equal, round, and reactive to light.   Cardiovascular:      Rate and Rhythm: Normal rate and regular rhythm.      Pulses: Normal pulses.      Heart sounds: Normal heart sounds. No murmur heard.     No friction rub. No gallop.   Pulmonary:      Effort: Pulmonary effort is normal. No respiratory distress.      Breath sounds: Normal breath sounds. No stridor. No wheezing, rhonchi or rales.   Abdominal:      General: Abdomen is flat. Bowel sounds are normal. There is no distension.      Palpations: Abdomen is soft. There is no mass.      Tenderness: There is no abdominal tenderness. There is no right CVA tenderness, left CVA tenderness, guarding or rebound.      Hernia: No hernia is present.   Musculoskeletal:         General: No swelling, deformity or signs of injury. Normal range of motion.      Cervical back: Normal range of motion and neck supple. No rigidity.      Right lower leg: No edema.      Left lower leg: No edema.   Lymphadenopathy:      Cervical: No cervical adenopathy.   Skin:     General: Skin is warm and dry.      Capillary Refill: Capillary refill takes less than 2 seconds.      Findings: No rash.   Neurological:      General: No focal deficit present.       Mental Status: He is alert and oriented to person, place, and time. Mental status is at baseline.      Cranial Nerves: No cranial nerve deficit.      Sensory: No sensory deficit.      Motor: No weakness.      Coordination: Coordination normal.   Psychiatric:         Mood and Affect: Mood normal.         Behavior: Behavior normal.         Assessment/Plan   Problem List Items Addressed This Visit    None  Visit Diagnoses       Otitis media with effusion, bilateral    -  Primary    Relevant Medications    oxymetazoline (Afrin, oxymetazoline,) 0.05 % nasal spray    fexofenadine (Allegra) 180 mg tablet    Dysfunction of both eustachian tubes        Relevant Medications    oxymetazoline (Afrin, oxymetazoline,) 0.05 % nasal spray    fexofenadine (Allegra) 180 mg tablet          Physical exam shows bilateral otitis media with effusion, no evidence of AOM or otitis externa.  Patient already takes Flonase daily, will add Afrin and antihistamine.  Follow-up in 1 week if not improving.      Discussed with attending physician Dr. Piper.       Yo Cifuentes DO

## 2024-07-22 NOTE — PATIENT INSTRUCTIONS
Dario,     Good to meet you.     For Eustachian tube dysfunction,   Try Afrin twice daily for 3 days.   Start Allegra once daily.     You can use Debrox to help with ear wax. You do not have ear wax in your ears today.

## 2024-07-24 ENCOUNTER — TELEPHONE (OUTPATIENT)
Dept: PRIMARY CARE | Facility: CLINIC | Age: 54
End: 2024-07-24
Payer: COMMERCIAL

## 2024-07-25 DIAGNOSIS — H69.93 DYSFUNCTION OF BOTH EUSTACHIAN TUBES: ICD-10-CM

## 2024-07-25 DIAGNOSIS — H65.93 OTITIS MEDIA WITH EFFUSION, BILATERAL: ICD-10-CM

## 2024-07-25 RX ORDER — OXYMETAZOLINE HYDROCHLORIDE 0.05 G/100ML
2 SPRAY, METERED NASAL EVERY 12 HOURS PRN
Qty: 30 ML | Refills: 0 | Status: SHIPPED | OUTPATIENT
Start: 2024-07-25 | End: 2024-07-27

## 2024-07-25 RX ORDER — MINERAL OIL
180 ENEMA (ML) RECTAL DAILY
Qty: 30 TABLET | Refills: 0 | Status: SHIPPED | OUTPATIENT
Start: 2024-07-25 | End: 2024-08-24

## 2024-08-01 ENCOUNTER — TELEPHONE (OUTPATIENT)
Dept: PRIMARY CARE | Facility: CLINIC | Age: 54
End: 2024-08-01
Payer: COMMERCIAL

## 2024-08-03 DIAGNOSIS — H65.93 OTITIS MEDIA WITH EFFUSION, BILATERAL: Primary | ICD-10-CM

## 2024-08-21 DIAGNOSIS — H65.93 OTITIS MEDIA WITH EFFUSION, BILATERAL: ICD-10-CM

## 2024-08-21 DIAGNOSIS — H69.93 DYSFUNCTION OF BOTH EUSTACHIAN TUBES: ICD-10-CM

## 2024-08-21 RX ORDER — MINERAL OIL
180 ENEMA (ML) RECTAL DAILY
Qty: 30 TABLET | Refills: 0 | Status: SHIPPED | OUTPATIENT
Start: 2024-08-21 | End: 2024-09-20

## 2024-08-23 ENCOUNTER — APPOINTMENT (OUTPATIENT)
Dept: PRIMARY CARE | Facility: CLINIC | Age: 54
End: 2024-08-23
Payer: COMMERCIAL

## 2024-08-23 VITALS
WEIGHT: 238 LBS | SYSTOLIC BLOOD PRESSURE: 130 MMHG | OXYGEN SATURATION: 98 % | HEART RATE: 80 BPM | RESPIRATION RATE: 14 BRPM | HEIGHT: 75 IN | BODY MASS INDEX: 29.59 KG/M2 | DIASTOLIC BLOOD PRESSURE: 70 MMHG | TEMPERATURE: 97.6 F

## 2024-08-23 DIAGNOSIS — Z00.00 PERIODIC HEALTH ASSESSMENT, GENERAL SCREENING, ADULT: Primary | ICD-10-CM

## 2024-08-23 DIAGNOSIS — Z12.5 SCREENING FOR PROSTATE CANCER: ICD-10-CM

## 2024-08-23 DIAGNOSIS — E78.00 PURE HYPERCHOLESTEROLEMIA: ICD-10-CM

## 2024-08-23 DIAGNOSIS — H69.93 DYSFUNCTION OF BOTH EUSTACHIAN TUBES: ICD-10-CM

## 2024-08-23 DIAGNOSIS — H04.129 DRY EYE: ICD-10-CM

## 2024-08-23 DIAGNOSIS — J45.20 MILD INTERMITTENT REACTIVE AIRWAY DISEASE WITHOUT COMPLICATION (HHS-HCC): ICD-10-CM

## 2024-08-23 PROCEDURE — 3008F BODY MASS INDEX DOCD: CPT | Performed by: INTERNAL MEDICINE

## 2024-08-23 PROCEDURE — 99396 PREV VISIT EST AGE 40-64: CPT | Performed by: INTERNAL MEDICINE

## 2024-08-23 RX ORDER — VARENICLINE 0.03 MG/.05ML
1 SPRAY NASAL 2 TIMES DAILY
Qty: 4.2 ML | Refills: 3 | Status: SHIPPED | OUTPATIENT
Start: 2024-08-23 | End: 2024-08-26

## 2024-08-23 ASSESSMENT — ENCOUNTER SYMPTOMS
CONSTIPATION: 0
SHORTNESS OF BREATH: 0
DIARRHEA: 0
NAUSEA: 0
WHEEZING: 0
PALPITATIONS: 0
ABDOMINAL PAIN: 0
COUGH: 0

## 2024-08-23 NOTE — PROGRESS NOTES
"Subjective   Patient ID: Dario Buckley is a 54 y.o. male who presents for Annual Exam.    Overall doing well.  Patient is fairly active.  Denies any issues with CP,SOB or dizzy spells.  No issues with anxiety, depression or sleep related problems. Denies any issues with HA, numbness or tingling.  No issues or changes with bowel or bladder habits.      Review of Systems   Respiratory:  Negative for cough, shortness of breath and wheezing.    Cardiovascular:  Negative for chest pain and palpitations.   Gastrointestinal:  Negative for abdominal pain, constipation, diarrhea and nausea.   ROS is otherwise unremarkable.       Objective   /70 (BP Location: Left arm, Patient Position: Sitting, BP Cuff Size: Adult)   Pulse 80   Temp 36.4 °C (97.6 °F) (Tympanic)   Resp 14   Ht 1.905 m (6' 3\")   Wt 108 kg (238 lb)   SpO2 98%   BMI 29.75 kg/m²     Physical Exam  Vitals reviewed.   Constitutional:       Appearance: Normal appearance.   HENT:      Head: Normocephalic.   Cardiovascular:      Rate and Rhythm: Normal rate and regular rhythm.   Pulmonary:      Effort: Pulmonary effort is normal.      Breath sounds: Normal breath sounds.   Musculoskeletal:         General: Normal range of motion.   Neurological:      General: No focal deficit present.      Mental Status: He is alert.   Psychiatric:         Mood and Affect: Mood normal.         Assessment/Plan   Problem List Items Addressed This Visit             ICD-10-CM    Pure hypercholesterolemia E78.00    RAD (reactive airway disease) ([facility]) J45.909     Other Visit Diagnoses         Codes    Periodic health assessment, general screening, adult    -  Primary Z00.00    Relevant Orders    Hemoglobin A1C    CBC    Comprehensive Metabolic Panel    Lipid Panel    Thyroid Stimulating Hormone    Screening for prostate cancer     Z12.5    Relevant Orders    Prostate Specific Antigen    Dysfunction of both eustachian tubes     H69.93    Dry eye     H04.129    Relevant " Medications    varenicline (Tyrvaya) 0.03 mg/spray        Physical exam is unremarkable.  We reviewed and discussed all the above.  We discussed current medications as well as most recent test results.  We discussed the importance and benefits of a healthy diet that is both low in sugars and low in saturated fats.  We reviewed and discussed the benefits of regular physical exercise especially when at or above a level of 150 minutes/week.  We also discussed the importance of stress management and good sleep hygiene.  We will continue to work on lifestyle improvements and follow-up in 6 to 12 months, sooner if any issues should arise.

## 2024-08-26 DIAGNOSIS — H04.129 DRY EYE: ICD-10-CM

## 2024-08-26 RX ORDER — VARENICLINE 0.03 MG/.05ML
SPRAY NASAL
Qty: 8.4 ML | Refills: 0 | Status: SHIPPED | OUTPATIENT
Start: 2024-08-26

## 2024-08-26 RX ORDER — VARENICLINE 0.03 MG/.05ML
SPRAY NASAL
Qty: 1 ML | Refills: 0 | Status: SHIPPED | OUTPATIENT
Start: 2024-08-26 | End: 2024-08-26

## 2024-09-03 ENCOUNTER — TELEPHONE (OUTPATIENT)
Dept: PRIMARY CARE | Facility: CLINIC | Age: 54
End: 2024-09-03
Payer: COMMERCIAL

## 2024-09-05 ENCOUNTER — APPOINTMENT (OUTPATIENT)
Dept: OTOLARYNGOLOGY | Facility: CLINIC | Age: 54
End: 2024-09-05
Payer: COMMERCIAL

## 2024-09-05 ENCOUNTER — TELEPHONE (OUTPATIENT)
Dept: PRIMARY CARE | Facility: CLINIC | Age: 54
End: 2024-09-05

## 2024-09-05 ENCOUNTER — CLINICAL SUPPORT (OUTPATIENT)
Dept: AUDIOLOGY | Facility: CLINIC | Age: 54
End: 2024-09-05
Payer: COMMERCIAL

## 2024-09-05 VITALS — WEIGHT: 235 LBS | HEIGHT: 76 IN | BODY MASS INDEX: 28.62 KG/M2

## 2024-09-05 DIAGNOSIS — H90.3 SENSORINEURAL HEARING LOSS (SNHL) OF BOTH EARS: Primary | ICD-10-CM

## 2024-09-05 DIAGNOSIS — H69.92 DISORDER OF LEFT EUSTACHIAN TUBE: ICD-10-CM

## 2024-09-05 DIAGNOSIS — H65.93 OTITIS MEDIA WITH EFFUSION, BILATERAL: ICD-10-CM

## 2024-09-05 DIAGNOSIS — R49.0 MUSCLE TENSION DYSPHONIA: Primary | ICD-10-CM

## 2024-09-05 DIAGNOSIS — R49.8 VOICE FATIGUE: ICD-10-CM

## 2024-09-05 PROCEDURE — 3008F BODY MASS INDEX DOCD: CPT | Performed by: OTOLARYNGOLOGY

## 2024-09-05 PROCEDURE — 92557 COMPREHENSIVE HEARING TEST: CPT

## 2024-09-05 PROCEDURE — 31579 LARYNGOSCOPY TELESCOPIC: CPT | Performed by: OTOLARYNGOLOGY

## 2024-09-05 PROCEDURE — 92550 TYMPANOMETRY & REFLEX THRESH: CPT

## 2024-09-05 PROCEDURE — 99214 OFFICE O/P EST MOD 30 MIN: CPT | Performed by: OTOLARYNGOLOGY

## 2024-09-05 ASSESSMENT — PATIENT HEALTH QUESTIONNAIRE - PHQ9
SUM OF ALL RESPONSES TO PHQ9 QUESTIONS 1 AND 2: 0
1. LITTLE INTEREST OR PLEASURE IN DOING THINGS: NOT AT ALL
2. FEELING DOWN, DEPRESSED OR HOPELESS: NOT AT ALL

## 2024-09-05 NOTE — PROGRESS NOTES
ASSESSMENT AND PLAN:   Dario Buckley is a 54 y.o. male with a history of voice changes. He has been doing well. He had a sensation of pressure in the left ear during a recent mountain climbing up to ~7000 ft.  Some pressure in the left > right ear since then. He has a symmetric SNHL (reviewed audiogram) with slightly negative inner ear pressures. No issues seen on my pneumatic otoscopy.       His bilateral thyroplasty looks great on strobe exam. Incision is well healed.  He has occasional changes in voice but infrequent and not overly bothersome per the patient. He was reassured that there was no significant concerns. He will use Flonase to treat his ETD. He was given information on our neurotology team.     He will follow up as needed.   Provided contact information for our otology service if he does have any concerns with ears after trial of flonase.        Reason For Consult  Chief Complaint   Patient presents with    Follow-up     Pain and pressure in ears during and after trip to mountains.        HISTORY OF PRESENT ILLNESS:  Dario Buckley is a 54 y.o. male presenting for a follow up visit with me for pain and pressure in the ears after a mountain trip.      The patient reports that this has improved.  He is using q-tips to clean his ears.        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 He is doing quite well in regards to his voice. He had some sinus symptoms in 5/2022 which he underwent sinus CT for.      These images were negative, however he wanted to discuss these findings and we set up a virtual visit. We went through sinus scans showing no concerns for anatomy. He is currently asymptomatic with no recurrent sinus issues or pain but was symptomatic when the studies was performed. He is doing sinus rinses regularly since 10/2021 and feels this is helpful.     F/U PRN        Past Medical History  He has a past medical history of  "Laceration without foreign body, unspecified foot, initial encounter (09/25/2018), Other chest pain (11/07/2013), Other conditions influencing health status (03/03/2018), Other sleep disorders (10/05/2015), Pain in left shoulder (08/11/2017), Pain in right shoulder (07/03/2018), Personal history of other diseases of the circulatory system, Personal history of other diseases of the respiratory system (03/03/2018), Personal history of other specified conditions (04/18/2016), Personal history of other specified conditions, Somnolence, and Vitamin D deficiency, unspecified. Surgical History  He has a past surgical history that includes Other surgical history (11/07/2013); Gallbladder surgery (08/11/2015); Tonsillectomy (08/11/2015); Hand surgery (08/11/2015); and Other surgical history (01/12/2014).   Social History  He reports that he has never smoked. He has never used smokeless tobacco. He reports that he does not currently use alcohol. He reports that he does not currently use drugs. Allergies  Bee pollen     Family History  No family history on file.    Review of Systems  All 10 systems were reviewed and negative except for above.      Last Recorded Vitals  Height 1.918 m (6' 3.5\"), weight 107 kg (235 lb).    Physical Exam  ENT Physical Exam  Constitutional  Appearance: patient appears well-developed and well-nourished,  Head and Face  Appearance: head appears normal and face appears normal;  Ear  Auricles: right auricle normal; left auricle normal;  Nose  External Nose: nares patent bilaterally;  Oral Cavity/Oropharynx  Lips: normal;  Neck  Neck: neck normal; neck palpation normal;  Respiratory  Inspection: no retractions visible;  Cardiovascular  Inspection: no peripheral edema present;  Neurovestibular  Mental Status: alert and oriented;  Psychiatric: mood normal;  Cranial Nerves: cranial nerves intact;        und.      Procedures     Flexible Laryngoscopy w/ Videostroboscopy    VOICE AND SPEECH " CHARACTERISTICS:  Normal spoken speech, no dysphonia, no roughness, no breathiness, no asthenia, (+) mild strain.    Additional Voice Characteristics:   Pitch: normal  Intelligibility: normal.   Resonance: balanced.   Vocal Loudness: normal.   Breath Support: normal.    PROCEDURE:    Indications: voice change  Procedure Note      Post-operative Diagnosis: same    Anesthesia: Lidocaine 2% and Charles-Synephrine 1/2%    Endoscopy Type:  Flexible Laryngoscopy    Procedure Details:    The patient was placed in the sitting position.  After topical anesthesia and decongestion, the 4 mm laryngoscope was passed.  The nasal cavities, nasopharynx, oropharynx, hypopharynx, and larynx were all examined.  Vocal cords were examined during respiration and phonation.  The following findings were noted:  Condition:  Stable.  Patient tolerated procedure well.    Complications:  None  Patient is seated in the exam chair. After adequate topical anesthesia, I advance the flexible endoscope. The examination included evaluation of the vogt, vallecula, base of tongue, pyriforms, post-cricoid area, larynx and immediate subglottis.  Findings : assessment of the nasopharynx, base of tongue/vallecula, pyriform sinuses, post-cricoid area and pharyngeal walls was without lesion or mass, pharyngeal wall contraction is normal and symmetric, and no pooling of secretions  Reflux finding score: 0/26  (>7 95% significant)  Gross Arytenoid Movement: symmetric.  Arytenoid Height: normal.   Supraglottic Tension: none.  Closure: closed.      Time Spent  Prep time on day of patient encounter: 10 minutes  Time spent directly with patient, family or caregiver: 15 minutes  Additional Time Spent on Patient Care Activities/Discussion with SLP re care plan: 5 minutes  Documentation Time: 10 minutes  Other Time Spent: 0 minutes  Total: 40 minutes       Scribe Attestation  By signing my name below, IVannessa , Scribe attest that this documentation has been  prepared under the direction and in the presence of Mumtaz Larkin MD.

## 2024-09-05 NOTE — PROGRESS NOTES
ADULT AUDIOLOGY EVALUATION    Name:  Dario Buckley  :  1970  Age:  54 y.o.  Date of Evaluation:  24    IMPRESSIONS     Today's test results indicate normal middle ear functioning bilaterally, with normal sloping to mild sensorineural hearing loss in the left ear, and normal sloping to moderate largely sensorineural hearing loss in the right ear. Word recognition is excellent in both ears.     RECOMMENDATIONS     Continue medical follow up with PCP and ENT. Patient is scheduled to see Mumtaz Larkin MD directly following today's visit.  Patient was counseled in regard to findings. No hearing aid recommended at this time due to borderline mild hearing loss at aidable frequencies.   Use of hearing protection in loud environments.  Return for annual hearing evaluation or sooner should new concerns arise.    Time: 9799-3202    HISTORY     Dario Buckley is seen today for an audiologic evaluation in conjunction with otolaryngology. Patient reports that he traveled to [place] on  and went on a mountain excursion with an altitude over 7000 feet. Within 3 hours, he began to experience a severe headache and bilateral earache. This went away after several days, but later returned after traveling home by train. Patient reports residual pressure in both ears, and ear pain that is noticed at night. He also states that he can feel and hear his pulse. Patient also noted hearing difficulty associated with symptoms, that is especially worse in background noise. Patient denied dizziness, otorrhea, history of otologic surgeries or history of loud noise exposure. He noted that his father wears bilateral hearing aids, but attributed this to history of noise exposure through  service.      TEST RESULTS     Otoscopic Evaluation:  Right Ear: Clear ear canal with unremarkable tympanic membrane  Left Ear: Clear ear canal with unremarkable tympanic membrane    Tympanometry:   Right Ear: Normal, type A  tympanogram with normal ear canal volume, peak pressure and compliance.   Left Ear: Normal, type A tympanogram with normal ear canal volume, peak pressure and compliance.     Ipsilateral Acoustic Reflexes:   Right Ear: Absent 500-4000 Hz.  Left Ear: Present 500-4000 Hz.     Pure Tone Audiometry (125-8000 Hz):     Right Ear: Normal hearing sensitivity from 125-500 Hz, falling to mild largely sensorineural hearing loss from 0208-4393 Hz, rising back to normal from 6690-7989, and sloping to mild at 6000 Hz, and moderate at 8000 Hz. A 20 dB) air-bone gap was observed at 1000 Hz only.    Left Ear: Normal to borderline mild sensorineural hearing loss from 125-2000 Hz, rising back to normal from 4737-5912 Hz, and sloping to mild from 5973-7880 Hz.    Speech Audiometry:   Right Ear:    Speech Reception Threshold (SRT) was obtained at 25 dBHL using monitored live voice (MLV).   Word Recognition scores were excellent (100%) in quiet when words were presented at 60 dBHL using recorded NU-6 word list ordered by difficulty.  Left Ear:    Speech Reception Threshold (SRT) was obtained at 20 dBHL using monitored live voice (MLV).   Word Recognition scores were excellent (100%) in quiet when words were presented at 60 dBHL using recorded NU-6 word list ordered by difficulty.         Steve Hill, CCC-A  Clinical Audiologist    Degree of Hearing Sensitivity Decibel Range   Within Normal Limits (WNL) 0-25   Mild 26-40   Moderate 41-55   Moderately-Severe 56-70   Severe 71-90   Profound 91+      Key   CNT/DNT Could Not Test/Did Not Test   TM Tympanic Membrane   WNL Within Normal Limits   HA Hearing Aid   SNHL Sensorineural Hearing Loss   CHL Conductive Hearing Loss   NIHL Noise-Induced Hearing Loss   ECV Ear Canal Volume   MLV Monitored Live Voice     AUDIOGRAM

## 2024-09-06 DIAGNOSIS — H04.129 DRY EYE: ICD-10-CM

## 2024-09-06 RX ORDER — EPINASTINE HYDROCHLORIDE 0.5 MG/ML
1 SOLUTION/ DROPS OPHTHALMIC 2 TIMES DAILY
Qty: 5 ML | Refills: 2 | Status: SHIPPED | OUTPATIENT
Start: 2024-09-06

## 2024-09-07 ENCOUNTER — LAB (OUTPATIENT)
Dept: LAB | Facility: LAB | Age: 54
End: 2024-09-07
Payer: COMMERCIAL

## 2024-09-07 DIAGNOSIS — Z00.00 PERIODIC HEALTH ASSESSMENT, GENERAL SCREENING, ADULT: ICD-10-CM

## 2024-09-07 DIAGNOSIS — Z12.5 SCREENING FOR PROSTATE CANCER: ICD-10-CM

## 2024-09-07 LAB
ALBUMIN SERPL BCP-MCNC: 4.2 G/DL (ref 3.4–5)
ALP SERPL-CCNC: 55 U/L (ref 33–120)
ALT SERPL W P-5'-P-CCNC: 26 U/L (ref 10–52)
ANION GAP SERPL CALC-SCNC: 13 MMOL/L (ref 10–20)
AST SERPL W P-5'-P-CCNC: 21 U/L (ref 9–39)
BILIRUB SERPL-MCNC: 1.2 MG/DL (ref 0–1.2)
BUN SERPL-MCNC: 16 MG/DL (ref 6–23)
CALCIUM SERPL-MCNC: 9.4 MG/DL (ref 8.6–10.6)
CHLORIDE SERPL-SCNC: 105 MMOL/L (ref 98–107)
CHOLEST SERPL-MCNC: 231 MG/DL (ref 0–199)
CHOLESTEROL/HDL RATIO: 5.6
CO2 SERPL-SCNC: 28 MMOL/L (ref 21–32)
CREAT SERPL-MCNC: 1.12 MG/DL (ref 0.5–1.3)
EGFRCR SERPLBLD CKD-EPI 2021: 78 ML/MIN/1.73M*2
ERYTHROCYTE [DISTWIDTH] IN BLOOD BY AUTOMATED COUNT: 13 % (ref 11.5–14.5)
EST. AVERAGE GLUCOSE BLD GHB EST-MCNC: 103 MG/DL
GLUCOSE SERPL-MCNC: 90 MG/DL (ref 74–99)
HBA1C MFR BLD: 5.2 %
HCT VFR BLD AUTO: 44.6 % (ref 41–52)
HDLC SERPL-MCNC: 41.3 MG/DL
HGB BLD-MCNC: 14.7 G/DL (ref 13.5–17.5)
LDLC SERPL CALC-MCNC: 149 MG/DL
MCH RBC QN AUTO: 33.3 PG (ref 26–34)
MCHC RBC AUTO-ENTMCNC: 33 G/DL (ref 32–36)
MCV RBC AUTO: 101 FL (ref 80–100)
NON HDL CHOLESTEROL: 190 MG/DL (ref 0–149)
NRBC BLD-RTO: 0 /100 WBCS (ref 0–0)
PLATELET # BLD AUTO: 189 X10*3/UL (ref 150–450)
POTASSIUM SERPL-SCNC: 3.8 MMOL/L (ref 3.5–5.3)
PROT SERPL-MCNC: 7.4 G/DL (ref 6.4–8.2)
PSA SERPL-MCNC: 1.46 NG/ML
RBC # BLD AUTO: 4.41 X10*6/UL (ref 4.5–5.9)
SODIUM SERPL-SCNC: 142 MMOL/L (ref 136–145)
TRIGL SERPL-MCNC: 205 MG/DL (ref 0–149)
TSH SERPL-ACNC: 1.5 MIU/L (ref 0.44–3.98)
VLDL: 41 MG/DL (ref 0–40)
WBC # BLD AUTO: 5.7 X10*3/UL (ref 4.4–11.3)

## 2024-09-07 PROCEDURE — 85027 COMPLETE CBC AUTOMATED: CPT

## 2024-09-07 PROCEDURE — 84443 ASSAY THYROID STIM HORMONE: CPT

## 2024-09-07 PROCEDURE — 80053 COMPREHEN METABOLIC PANEL: CPT

## 2024-09-07 PROCEDURE — 83036 HEMOGLOBIN GLYCOSYLATED A1C: CPT

## 2024-09-07 PROCEDURE — 36415 COLL VENOUS BLD VENIPUNCTURE: CPT

## 2024-09-07 PROCEDURE — 80061 LIPID PANEL: CPT

## 2024-09-07 PROCEDURE — 84153 ASSAY OF PSA TOTAL: CPT

## 2024-10-30 DIAGNOSIS — J31.0 RHINITIS, UNSPECIFIED TYPE: ICD-10-CM

## 2024-10-30 RX ORDER — FLUTICASONE PROPIONATE 50 MCG
2 SPRAY, SUSPENSION (ML) NASAL 2 TIMES DAILY
Qty: 48 ML | Refills: 1 | Status: SHIPPED | OUTPATIENT
Start: 2024-10-30 | End: 2025-10-30

## 2024-10-31 ENCOUNTER — OFFICE VISIT (OUTPATIENT)
Dept: PODIATRY | Facility: CLINIC | Age: 54
End: 2024-10-31
Payer: COMMERCIAL

## 2024-10-31 DIAGNOSIS — M79.672 PAIN IN BOTH FEET: ICD-10-CM

## 2024-10-31 DIAGNOSIS — M79.671 PAIN IN BOTH FEET: ICD-10-CM

## 2024-10-31 DIAGNOSIS — M72.2 PLANTAR FASCIITIS, BILATERAL: Primary | ICD-10-CM

## 2024-10-31 DIAGNOSIS — M77.42 METATARSALGIA OF BOTH FEET: ICD-10-CM

## 2024-10-31 DIAGNOSIS — M77.41 METATARSALGIA OF BOTH FEET: ICD-10-CM

## 2024-10-31 PROCEDURE — 99213 OFFICE O/P EST LOW 20 MIN: CPT | Performed by: PODIATRIST

## 2024-10-31 PROCEDURE — 99203 OFFICE O/P NEW LOW 30 MIN: CPT | Performed by: PODIATRIST

## 2024-11-18 ENCOUNTER — APPOINTMENT (OUTPATIENT)
Dept: PODIATRY | Facility: CLINIC | Age: 54
End: 2024-11-18
Payer: COMMERCIAL

## 2024-11-18 DIAGNOSIS — M77.41 METATARSALGIA OF BOTH FEET: ICD-10-CM

## 2024-11-18 DIAGNOSIS — M72.2 PLANTAR FASCIITIS, BILATERAL: ICD-10-CM

## 2024-11-18 DIAGNOSIS — M77.42 METATARSALGIA OF BOTH FEET: ICD-10-CM

## 2024-11-18 PROCEDURE — 99212 OFFICE O/P EST SF 10 MIN: CPT | Performed by: PODIATRIST

## 2024-11-18 NOTE — PROGRESS NOTES
History Of Present Illness  Dario Buckley is a 54 y.o. male presenting with chief complaint of: custom orthotics & stretching exercise for planter fascitits.    PCP Rhett Nichols DO  Last visit 8/23/24     Past Medical History  He has a past medical history of Laceration without foreign body, unspecified foot, initial encounter (09/25/2018), Other chest pain (11/07/2013), Other conditions influencing health status (03/03/2018), Other sleep disorders (10/05/2015), Pain in left shoulder (08/11/2017), Pain in right shoulder (07/03/2018), Personal history of other diseases of the circulatory system, Personal history of other diseases of the respiratory system (03/03/2018), Personal history of other specified conditions (04/18/2016), Personal history of other specified conditions, Somnolence, and Vitamin D deficiency, unspecified.    Surgical History  He has a past surgical history that includes Other surgical history (11/07/2013); Gallbladder surgery (08/11/2015); Tonsillectomy (08/11/2015); Hand surgery (08/11/2015); and Other surgical history (01/12/2014).     Social History  He reports that he has never smoked. He has never used smokeless tobacco. He reports that he does not currently use alcohol. He reports that he does not currently use drugs.    Family History  No family history on file.     Allergies  Bee pollen    Medications  Current Outpatient Medications   Medication Sig Dispense Refill    epinastine (Elestat) 0.05 % ophthalmic solution Administer 1 drop into both eyes 2 times a day. 5 mL 2    fish oil concentrate (Omega-3) 120-180 mg capsule Take by mouth.      fluticasone (Flonase) 50 mcg/actuation nasal spray ADMINISTER 2 SPRAYS INTO EACH NOSTRIL 2 TIMES A DAY. 48 mL 1    omeprazole (PriLOSEC) 40 mg DR capsule Take 1 capsule (40 mg) by mouth once daily in the morning. Take before meals. 90 capsule 3    Tyrvaya 0.03 mg/spray PRIOR AUTH 8.4 mL 0    albuterol (ProAir HFA) 90 mcg/actuation inhaler  Inhale 2 puffs every 4 hours if needed for wheezing or shortness of breath. 8.5 g 0    oxymetazoline (Afrin, oxymetazoline,) 0.05 % nasal spray Administer 2 sprays into each nostril every 12 hours if needed for congestion for up to 2 days. Do not use for more than 3 days. 30 mL 0     No current facility-administered medications for this visit.       Review of Systems    REVIEW OF SYSTEMS  GENERAL:  Negative for malaise, significant weight loss, fever  CARDIOVASCULAR: leg swelling   MUSCULOSKELETAL:  Negative for joint pain or swelling, back pain, and muscle pain.  SKIN:  Negative for lesions, rash, and itching  PSYCH:  Negative for sleep disturbance, mood disorder and recent psychosocial stressors  NEURO: Negative, denies any burning, tingling or numbness     Objective:   Vasc: DP and PT pulses are palpable bilateral.  CFT is less than 3 seconds bilateral.  Skin temperature is warm to cool proximal to distal bilateral.  Varicosities are appreciated.    Neuro:  Light touch is intact to the foot bilateral.  There is no clonus noted.  The hallux is downgoing bilateral.      Derm: Nails are normal. Skin is supple with normal texture and turgor noted.  Webspaces are clean, dry and intact bilateral.  There are no hyperkeratoses, ulcerations, verruca or other lesions noted.      Ortho: Muscle strength is 5/5 for all pedal groups tested.  Ankle joint, subtalar joint, 1st MPJ and lesser MPJ ROM is full and without pain or crepitus.  The foot type is rectus bilateral off weight bearing.  There are no structural deformities noted.  Patient has tenderness along the plantar fascia.  Assessment/Plan     Diagnoses and all orders for this visit:  Plantar fasciitis, bilateral  -     Referral to Podiatry  Metatarsalgia of both feet  -     Referral to Podiatry      Will look into custom fit orthotics.  If covered, will send in current molds.  Patient is given instructions for stretching exercises to try

## 2024-11-21 ENCOUNTER — OFFICE VISIT (OUTPATIENT)
Dept: PODIATRY | Facility: CLINIC | Age: 54
End: 2024-11-21
Payer: COMMERCIAL

## 2024-11-21 DIAGNOSIS — M79.671 PAIN IN BOTH FEET: ICD-10-CM

## 2024-11-21 DIAGNOSIS — M72.2 PLANTAR FASCIITIS, BILATERAL: Primary | ICD-10-CM

## 2024-11-21 DIAGNOSIS — M77.42 METATARSALGIA OF BOTH FEET: ICD-10-CM

## 2024-11-21 DIAGNOSIS — M79.672 PAIN IN BOTH FEET: ICD-10-CM

## 2024-11-21 DIAGNOSIS — M77.41 METATARSALGIA OF BOTH FEET: ICD-10-CM

## 2024-11-21 PROCEDURE — L3000 FT INSERT UCB BERKELEY SHELL: HCPCS | Performed by: PODIATRIST

## 2024-11-21 NOTE — PROGRESS NOTES
History Of Present Illness  Dario Buckley is a 54 y.o. male presenting with chief complaint of: orthotic mold     Past Medical History  He has a past medical history of Laceration without foreign body, unspecified foot, initial encounter (09/25/2018), Other chest pain (11/07/2013), Other conditions influencing health status (03/03/2018), Other sleep disorders (10/05/2015), Pain in left shoulder (08/11/2017), Pain in right shoulder (07/03/2018), Personal history of other diseases of the circulatory system, Personal history of other diseases of the respiratory system (03/03/2018), Personal history of other specified conditions (04/18/2016), Personal history of other specified conditions, Somnolence, and Vitamin D deficiency, unspecified.    Surgical History  He has a past surgical history that includes Other surgical history (11/07/2013); Gallbladder surgery (08/11/2015); Tonsillectomy (08/11/2015); Hand surgery (08/11/2015); and Other surgical history (01/12/2014).     Social History  He reports that he has never smoked. He has never used smokeless tobacco. He reports that he does not currently use alcohol. He reports that he does not currently use drugs.    Family History  No family history on file.     Allergies  Bee pollen    Medications  Current Outpatient Medications   Medication Sig Dispense Refill    epinastine (Elestat) 0.05 % ophthalmic solution Administer 1 drop into both eyes 2 times a day. 5 mL 2    fish oil concentrate (Omega-3) 120-180 mg capsule Take by mouth.      fluticasone (Flonase) 50 mcg/actuation nasal spray ADMINISTER 2 SPRAYS INTO EACH NOSTRIL 2 TIMES A DAY. 48 mL 1    omeprazole (PriLOSEC) 40 mg DR capsule Take 1 capsule (40 mg) by mouth once daily in the morning. Take before meals. 90 capsule 3    Tyrvaya 0.03 mg/spray PRIOR AUTH 8.4 mL 0    albuterol (ProAir HFA) 90 mcg/actuation inhaler Inhale 2 puffs every 4 hours if needed for wheezing or shortness of breath. 8.5 g 0    oxymetazoline  (Afrin, oxymetazoline,) 0.05 % nasal spray Administer 2 sprays into each nostril every 12 hours if needed for congestion for up to 2 days. Do not use for more than 3 days. 30 mL 0     No current facility-administered medications for this visit.       Review of Systems    REVIEW OF SYSTEMS  GENERAL:  Negative for malaise, significant weight loss, fever  CARDIOVASCULAR: leg swelling   MUSCULOSKELETAL:  Negative for joint pain or swelling, back pain, and muscle pain.  SKIN:  Negative for lesions, rash, and itching  PSYCH:  Negative for sleep disturbance, mood disorder and recent psychosocial stressors  NEURO: Negative, denies any burning, tingling or numbness     Objective: unchanged exam.      Assessment/Plan     Diagnoses and all orders for this visit:  Plantar fasciitis, bilateral  Metatarsalgia of both feet  Pain in both feet    Patient has been casted for orthotics.  Multiple been sent in.    PATIENT ORTHOTIC INSTRUCTIONS      Make sure your orthotics fit in your shoes properly.     Your shoes should not be worn down to the sole. If they are, replace the shoes or have them resoled.     Remove the original shoe liner or insert. Place the orthotic in the shoe and make sure it sits flat in the shoe.     If there is too much room in the front of the shoe by the toes, then replace the original shoe insert back into the soles after they have been cut and adjusted.     If the original shoe inserts are not fitting properly or are too bulky, then buy an air pillow liner for your shoes at the [facility].     Wear your orthotics for about 4 hours the first day, then increase the time you wear your orthotics by 1 hour per day.     If your orthotics feel good from the beginning, then wear them all day.     Wait at least 5 days until you begin athletic activities with your new orthotics in your shoes.     It will take your body time to get adjusted to your orthotics, so you may experience arch fatigue, leg or foot cramps, knees,  hip or back soreness. This may last for several days but is a normal body response.     In about 2-4 weeks you should be wearing your orthotics with no difficulty. If you continue to have foot pain then return to the office for an adjustment to your orthotics.     As with any other aspect of human nature the lower extremities are always in a state of change. These changes may or may not affect the performance of the orthotic devices. Therefore, periodic follow up examinations are necessary to insure proper function of the orthotic devices to meet the constantly changing clinical needs of each patient.     With good care of your orthotic devices and with periodic adjustments, your devices should last for about 3-5 years.

## 2024-11-21 NOTE — PATIENT INSTRUCTIONS
PATIENT ORTHOTIC INSTRUCTIONS      Make sure your orthotics fit in your shoes properly.     Your shoes should not be worn down to the sole. If they are, replace the shoes or have them resoled.     Remove the original shoe liner or insert. Place the orthotic in the shoe and make sure it sits flat in the shoe.     If there is too much room in the front of the shoe by the toes, then replace the original shoe insert back into the soles after they have been cut and adjusted.     If the original shoe inserts are not fitting properly or are too bulky, then buy an air pillow liner for your shoes at the [facility].     Wear your orthotics for about 4 hours the first day, then increase the time you wear your orthotics by 1 hour per day.     If your orthotics feel good from the beginning, then wear them all day.     Wait at least 5 days until you begin athletic activities with your new orthotics in your shoes.     It will take your body time to get adjusted to your orthotics, so you may experience arch fatigue, leg or foot cramps, knees, hip or back soreness. This may last for several days but is a normal body response.     In about 2-4 weeks you should be wearing your orthotics with no difficulty. If you continue to have foot pain then return to the office for an adjustment to your orthotics.     As with any other aspect of human nature the lower extremities are always in a state of change. These changes may or may not affect the performance of the orthotic devices. Therefore, periodic follow up examinations are necessary to insure proper function of the orthotic devices to meet the constantly changing clinical needs of each patient.     With good care of your orthotic devices and with periodic adjustments, your devices should last for about 3-5 years.

## 2024-12-17 ENCOUNTER — TELEPHONE (OUTPATIENT)
Dept: PODIATRY | Facility: CLINIC | Age: 54
End: 2024-12-17
Payer: COMMERCIAL

## 2024-12-17 NOTE — TELEPHONE ENCOUNTER
You had given Dario a referral to get shoes, he lost the referral and is wondering if he can get another one.  Thank you

## 2025-01-13 ENCOUNTER — OFFICE VISIT (OUTPATIENT)
Dept: URGENT CARE | Age: 55
End: 2025-01-13
Payer: COMMERCIAL

## 2025-01-13 VITALS
HEART RATE: 113 BPM | RESPIRATION RATE: 20 BRPM | BODY MASS INDEX: 29.84 KG/M2 | OXYGEN SATURATION: 94 % | HEIGHT: 75 IN | TEMPERATURE: 99.2 F | SYSTOLIC BLOOD PRESSURE: 111 MMHG | WEIGHT: 240 LBS | DIASTOLIC BLOOD PRESSURE: 59 MMHG

## 2025-01-13 DIAGNOSIS — J01.00 ACUTE NON-RECURRENT MAXILLARY SINUSITIS: Primary | ICD-10-CM

## 2025-01-13 DIAGNOSIS — R69 ILLNESS: ICD-10-CM

## 2025-01-13 LAB
POC RAPID INFLUENZA A: NEGATIVE
POC RAPID INFLUENZA B: NEGATIVE
POC RAPID STREP: NEGATIVE

## 2025-01-13 PROCEDURE — 3008F BODY MASS INDEX DOCD: CPT | Performed by: FAMILY MEDICINE

## 2025-01-13 PROCEDURE — 1036F TOBACCO NON-USER: CPT | Performed by: FAMILY MEDICINE

## 2025-01-13 PROCEDURE — 99203 OFFICE O/P NEW LOW 30 MIN: CPT | Performed by: FAMILY MEDICINE

## 2025-01-13 PROCEDURE — 87880 STREP A ASSAY W/OPTIC: CPT | Performed by: FAMILY MEDICINE

## 2025-01-13 PROCEDURE — 87804 INFLUENZA ASSAY W/OPTIC: CPT | Performed by: FAMILY MEDICINE

## 2025-01-13 RX ORDER — AMOXICILLIN AND CLAVULANATE POTASSIUM 875; 125 MG/1; MG/1
1 TABLET, FILM COATED ORAL 2 TIMES DAILY
Qty: 20 TABLET | Refills: 0 | Status: SHIPPED | OUTPATIENT
Start: 2025-01-13 | End: 2025-01-23

## 2025-01-13 ASSESSMENT — ENCOUNTER SYMPTOMS
SHORTNESS OF BREATH: 1
HEADACHES: 1
SORE THROAT: 1

## 2025-01-13 NOTE — PROGRESS NOTES
Subjective   Patient ID: Dario Buckley is a 54 y.o. male. He presents today with a chief complaint of Sore Throat, Headache, Nausea, and Shortness of Breath (Going on x3 days ).    History of Present Illness  Subjective  Dario Buckley is a 54 y.o. male who presents for evaluation of possible sinusitis. Symptoms include fever, headache, nasal congestion, shortness of breath, and sinus pressure. Onset of symptoms was 4 days ago and has been unchanged since that time. Treatment to date: Mucinex.          Sore Throat   Associated symptoms include headaches and shortness of breath.   Headache  Associated symptoms: sore throat    Shortness of Breath  Associated symptoms: headaches and sore throat        Past Medical History  Allergies as of 01/13/2025 - Reviewed 01/13/2025   Allergen Reaction Noted    Bee pollen Unknown 05/09/2023       (Not in a hospital admission)       Past Medical History:   Diagnosis Date    Laceration without foreign body, unspecified foot, initial encounter 09/25/2018    Foot laceration    Other chest pain 11/07/2013    Atypical chest pain    Other conditions influencing health status 03/03/2018    History of cough    Other sleep disorders 10/05/2015    Non-restorative sleep    Pain in left shoulder 08/11/2017    Left shoulder pain    Pain in right shoulder 07/03/2018    Right shoulder pain, unspecified chronicity    Personal history of other diseases of the circulatory system     History of first degree atrioventricular block    Personal history of other diseases of the respiratory system 03/03/2018    History of acute sinusitis    Personal history of other specified conditions 04/18/2016    History of palpitations    Personal history of other specified conditions     History of chest pain    Somnolence     Sleepiness    Vitamin D deficiency, unspecified     Vitamin D deficiency       Past Surgical History:   Procedure Laterality Date    GALLBLADDER SURGERY  08/11/2015    Gallbladder Surgery  "   HAND SURGERY  08/11/2015    Hand Surgery                                                                                                                                                          OTHER SURGICAL HISTORY  11/07/2013    Cardiovascular Stress Test    OTHER SURGICAL HISTORY  01/12/2014    History of esophageal reflux    TONSILLECTOMY  08/11/2015    Tonsillectomy        reports that he has never smoked. He has never used smokeless tobacco. He reports that he does not currently use alcohol. He reports that he does not currently use drugs.    Review of Systems  Review of Systems   HENT:  Positive for sore throat.    Respiratory:  Positive for shortness of breath.    Neurological:  Positive for headaches.                                  Objective    Vitals:    01/13/25 1115   BP: 111/59   BP Location: Left arm   Patient Position: Sitting   Pulse: (!) 113   Resp: 20   Temp: 37.3 °C (99.2 °F)   TempSrc: Oral   SpO2: 94%   Weight: 109 kg (240 lb)   Height: 1.905 m (6' 3\")     No LMP for male patient.    Physical Exam  Vitals and nursing note reviewed.   Constitutional:       General: He is not in acute distress.     Appearance: He is ill-appearing.   HENT:      Right Ear: Tympanic membrane, ear canal and external ear normal.      Left Ear: Tympanic membrane, ear canal and external ear normal.      Nose: Congestion present.      Mouth/Throat:      Mouth: Mucous membranes are moist.      Pharynx: Oropharynx is clear.   Eyes:      Extraocular Movements: Extraocular movements intact.      Conjunctiva/sclera: Conjunctivae normal.      Pupils: Pupils are equal, round, and reactive to light.   Cardiovascular:      Rate and Rhythm: Normal rate and regular rhythm.      Pulses: Normal pulses.      Heart sounds: Normal heart sounds.   Pulmonary:      Effort: Pulmonary effort is normal.      Breath sounds: Normal breath sounds. No wheezing, rhonchi or rales.   Musculoskeletal:      Cervical back: Normal range of motion " and neck supple. No rigidity or tenderness.   Lymphadenopathy:      Cervical: No cervical adenopathy.   Neurological:      Mental Status: He is alert.         Procedures    Point of Care Test & Imaging Results from this visit  No results found for this visit on 01/13/25.   No results found.    Diagnostic study results (if any) were reviewed by Jodi Nj MD.    Assessment/Plan   Allergies, medications, history, and pertinent labs/EKGs/Imaging reviewed by Jodi Nj MD.     Medical Decision Making      Orders and Diagnoses  Diagnoses and all orders for this visit:  Illness  -     POCT rapid strep A manually resulted  -     POCT Influenza A/B manually resulted      Medical Admin Record      Patient disposition: Home    Electronically signed by Jodi Nj MD  11:26 AM

## 2025-01-13 NOTE — LETTER
January 13, 2025     Patient: Dario Buckley   YOB: 1970   Date of Visit: 1/13/2025       To Whom It May Concern:    Dario Buckley was seen in my clinic on 1/13/2025 at 11:30 am. Please excuse Dario for his absence from work today and tomorrow.    If you have any questions or concerns, please don't hesitate to call.         Sincerely,         Jodi Nj MD        CC: No Recipients

## 2025-04-25 ASSESSMENT — ENCOUNTER SYMPTOMS
SWOLLEN GLANDS: 1
NECK PAIN: 0
TROUBLE SWALLOWING: 0
SORE THROAT: 1
DIARRHEA: 0
VOMITING: 0
COUGH: 0
STRIDOR: 0
HOARSE VOICE: 1
HEADACHES: 0
SHORTNESS OF BREATH: 0
ABDOMINAL PAIN: 0

## 2025-04-27 DIAGNOSIS — J31.0 RHINITIS, UNSPECIFIED TYPE: ICD-10-CM

## 2025-04-28 RX ORDER — FLUTICASONE PROPIONATE 50 MCG
2 SPRAY, SUSPENSION (ML) NASAL 2 TIMES DAILY
Qty: 48 ML | Refills: 1 | Status: SHIPPED | OUTPATIENT
Start: 2025-04-28 | End: 2026-04-28

## 2025-05-02 ENCOUNTER — OFFICE VISIT (OUTPATIENT)
Dept: OTOLARYNGOLOGY | Facility: CLINIC | Age: 55
End: 2025-05-02
Payer: COMMERCIAL

## 2025-05-02 VITALS — WEIGHT: 233 LBS | BODY MASS INDEX: 28.37 KG/M2 | TEMPERATURE: 97.2 F | HEIGHT: 76 IN

## 2025-05-02 DIAGNOSIS — K21.9 LARYNGOPHARYNGEAL REFLUX (LPR): Primary | ICD-10-CM

## 2025-05-02 DIAGNOSIS — Z98.890 HISTORY OF THYROPLASTY: ICD-10-CM

## 2025-05-02 DIAGNOSIS — R49.0 HOARSENESS OF VOICE: ICD-10-CM

## 2025-05-02 DIAGNOSIS — R09.A2 GLOBUS SENSATION: ICD-10-CM

## 2025-05-02 PROCEDURE — 3008F BODY MASS INDEX DOCD: CPT

## 2025-05-02 PROCEDURE — 1036F TOBACCO NON-USER: CPT

## 2025-05-02 PROCEDURE — 31575 DIAGNOSTIC LARYNGOSCOPY: CPT

## 2025-05-02 PROCEDURE — 99215 OFFICE O/P EST HI 40 MIN: CPT

## 2025-05-02 PROCEDURE — 99215 OFFICE O/P EST HI 40 MIN: CPT | Mod: 25

## 2025-05-02 ASSESSMENT — PAIN SCALES - GENERAL: PAINLEVEL_OUTOF10: 5

## 2025-05-02 NOTE — PROGRESS NOTES
Chief Complaint  Chief Complaint   Patient presents with    uvula     pain        Pertinent History:  Last seen by Dr. Larkin 24: bilateral thyroplasty looks great on strobe exam. Incision is well healed.  He has occasional changes in voice but infrequent and not overly bothersome per the patient. He was reassured that there was no significant concerns.   Interval History  Since last visit patient reports swelling of the uvula and back of throat when initially making the appointment that has since resolved. Patient reports voice has been stable. Patient reports episodes at night where he will wake up feeling like he is drowning. Patient has hx of acid relfux. Patient reports tolerating solids, liquids, and pills. Patient has hx of bilateral thyroplasty 21.     Exam:  VOICE: mild variable hoarseness  RESPIRATION: Breathing comfortably, no stridor.    ORAL CAVITY/OROPHARYNX/LIPS: Normal mucous membranes, normal floor of mouth/tongue/OP, no masses or lesions are noted.    SKIN: Neck skin is without scar or injury.    PSYCH: Alert and oriented with appropriate mood and affect.       PROCEDURE NOTE:  Recommended flexible laryngoscopy/stroboscopy .  Risks, benefits,  and alternatives were explained.   wished to proceed and provides verbal consent.     PROCEDURE:  Flexible Laryngoscopy, CPT 19470    POSTPROCEDURE DIAGNOSIS: LPR     INDICATIONS: Inability to tolerate mirror exam or abnormal findings on mirror, Flexible Laryngoscopy/Stroboscopy performed to assess one of the followin. Diagnosis of symptomatic disorder involving the voice, swallow, upper aerodigestive tract, including KELLIE disorders, or  2. Preoperative evaluation of vocal cord function for individuals undergoing surgery where the RLN or vagus nerves are at risk of injury, or  3. Further evaluation of abnormalities of the upper aerodigestive tract discovered by another modality, such as CT, MRI, bronchoscopy or EGD    Description of  Procedure:    After adequate afrin and lidocaine spray, I advanced the endoscope.  Visualization of the nasopharynx, vallecula, posterior pharyngeal walls, pyriform, epiglottis and post cricoid areas was unremarkable.  The following laryngeal findings were noted:    vocal cord movement was symmetric  closure was complete  Compression was increased AP > FVC   edema was  mild  interarytenoid edema present  lesions were none  the subglottis was widely patent  Pharyngeal wall squeeze was normal    Procedure well tolerated.   Assessment and Plan:  This is a follow up visit for previous swelling of the uvula and throat, which has resolved. No signs of abscess or infection in the mouth/throat. Patient has cobbling stoning of the pharynx. IA edema present- Discussed using Gaviscon or Reflux gourmet to help with LPR symptoms. Night time waking due to acid reflux present and would recommend continued use of Omeprazole daily. Would use gaviscon/reflux gourmet after meals and before bed. Patient can use seltzer water gargles/with hard swallow for mucus management in the throat. Follow up in 2-3 months. Patient agreeable to plan. All questions answered.

## 2025-06-05 ENCOUNTER — APPOINTMENT (OUTPATIENT)
Dept: OTOLARYNGOLOGY | Facility: CLINIC | Age: 55
End: 2025-06-05
Payer: COMMERCIAL

## 2025-07-07 ENCOUNTER — APPOINTMENT (OUTPATIENT)
Dept: PODIATRY | Facility: CLINIC | Age: 55
End: 2025-07-07
Payer: COMMERCIAL

## 2025-07-07 DIAGNOSIS — I87.2 VENOUS INSUFFICIENCY: ICD-10-CM

## 2025-07-07 DIAGNOSIS — M72.2 PLANTAR FASCIITIS, BILATERAL: Primary | ICD-10-CM

## 2025-07-07 DIAGNOSIS — G57.61 NEUROMA OF SECOND INTERSPACE OF RIGHT FOOT: ICD-10-CM

## 2025-07-07 RX ORDER — LIDOCAINE HYDROCHLORIDE 10 MG/ML
0.5 INJECTION, SOLUTION INFILTRATION; PERINEURAL
Status: COMPLETED | OUTPATIENT
Start: 2025-07-07 | End: 2025-07-07

## 2025-07-07 RX ORDER — TRIAMCINOLONE ACETONIDE 40 MG/ML
10 INJECTION, SUSPENSION INTRA-ARTICULAR; INTRAMUSCULAR
Status: COMPLETED | OUTPATIENT
Start: 2025-07-07 | End: 2025-07-07

## 2025-07-07 RX ADMIN — LIDOCAINE HYDROCHLORIDE 0.5 ML: 10 INJECTION, SOLUTION INFILTRATION; PERINEURAL at 17:08

## 2025-07-07 RX ADMIN — TRIAMCINOLONE ACETONIDE 10 MG: 40 INJECTION, SUSPENSION INTRA-ARTICULAR; INTRAMUSCULAR at 17:08

## 2025-07-07 NOTE — PROGRESS NOTES
History Of Present Illness  Dario Buckley is a 55 y.o. male presenting with chief complaint of:b/l foot pain, complains of popping sensation b/l feet mostly right  He questions why he ha has staining around his ankles.  Patient states the right foot feels more of a clicking sensation with ambulation which can elicit pain.    PCP Rhett Nichols DO  Last visit 8/23/24     Past Medical History  He has a past medical history of High arches (2012), Laceration without foreign body, unspecified foot, initial encounter (09/25/2018), Other chest pain (11/07/2013), Other conditions influencing health status (03/03/2018), Other sleep disorders (10/05/2015), Pain in left shoulder (08/11/2017), Pain in right shoulder (07/03/2018), Personal history of other diseases of the circulatory system, Personal history of other diseases of the respiratory system (03/03/2018), Personal history of other specified conditions (04/18/2016), Personal history of other specified conditions, Plantar fasciitis (2012), Somnolence, and Vitamin D deficiency, unspecified.    Surgical History  He has a past surgical history that includes Other surgical history (11/07/2013); Gallbladder surgery (08/11/2015); Tonsillectomy (08/11/2015); Hand surgery (08/11/2015); and Other surgical history (01/12/2014).     Social History  He reports that he has never smoked. He has never used smokeless tobacco. He reports that he does not currently use alcohol. He reports that he does not currently use drugs.    Family History  Family History[1]     Allergies  Bee pollen    Medications  Current Medications[2]    Review of Systems    REVIEW OF SYSTEMS  GENERAL:  Negative for malaise, significant weight loss, fever  CARDIOVASCULAR: leg swelling   MUSCULOSKELETAL:  Negative for joint pain or swelling, back pain, and muscle pain.  SKIN:  Negative for lesions, rash, and itching  PSYCH:  Negative for sleep disturbance, mood disorder and recent psychosocial  stressors  NEURO: Negative, denies any burning, tingling or numbness     Objective:   Vasc: DP and PT pulses are palpable bilateral.  CFT is less than 3 seconds bilateral.  Skin temperature is warm to cool proximal to distal bilateral.  Patient has mild pedal edema.  Patient does have prominent varicose veins on the medial aspect with venous staining.  His veins are nontender.    Neuro:  Light touch is intact to the foot bilateral.  Positive Calderon sign is elicited with compression of metatarsal heads 2 and 3 on the right foot.  There is no clonus noted.  The hallux is downgoing bilateral.      Derm: Nails are normal. Skin is supple with normal texture and turgor noted.  Webspaces are clean, dry and intact bilateral.  There are no hyperkeratoses, ulcerations, verruca or other lesions noted.      Ortho: Muscle strength is 5/5 for all pedal groups tested.  Ankle joint, subtalar joint, 1st MPJ and lesser MPJ ROM is full and without pain or crepitus.  The foot type is rectus bilateral off weight bearing.  When patient stands on current orthotics, they appear to be supporting him.  Patient is having some arch fatigue along with pain along plantar fascia.    Assessment/Plan     Diagnoses and all orders for this visit:  Plantar fasciitis, bilateral  -     Referral to Physical Therapy; Future  Venous insufficiency  -     Vascular US lower extremity venous insufficiency bilateral; Future  Neuroma of second interspace of right foot      At this time, I recommend the patient try some compression hose for his venous insufficiency.  Patient is worried about his overall circulation.  Will order a venous reflux study.  I explained that the staining would most likely be a permanent occurrence.  We did discuss shoe gear modification for plantar fasciitis.  At this time, patient will be referred to therapy.  Since patient's pain is so intense at his right second interspace we will do an injection today.Patient ID: Dario VILLASENOR Marcio is  a 55 y.o. male.    S Inj/Asp: R intertarsal on 7/7/2025 5:08 PM  Indications: pain  Details: 25 G needle  Medications: 10 mg triamcinolone acetonide 40 mg/mL; 0.5 mL lidocaine 10 mg/mL (1 %)    Second interspace right foot.                          [1]   Family History  Problem Relation Name Age of Onset    Cancer Paternal Grandfather Colby     Cancer Paternal Grandfather Colby     Colon cancer Paternal Grandfather Colby     Heart disease Maternal Grandmother Joslyn     Cancer Paternal Grandfather Colby    [2]   Current Outpatient Medications   Medication Sig Dispense Refill    epinastine (Elestat) 0.05 % ophthalmic solution Administer 1 drop into both eyes 2 times a day. 5 mL 2    fish oil concentrate (Omega-3) 120-180 mg capsule Take by mouth.      fluticasone (Flonase) 50 mcg/actuation nasal spray ADMINISTER 2 SPRAYS INTO EACH NOSTRIL 2 TIMES A DAY. 48 mL 1    Tyrvaya 0.03 mg/spray PRIOR AUTH 8.4 mL 0    albuterol (ProAir HFA) 90 mcg/actuation inhaler Inhale 2 puffs every 4 hours if needed for wheezing or shortness of breath. 8.5 g 0    omeprazole (PriLOSEC) 40 mg DR capsule Take 1 capsule (40 mg) by mouth once daily in the morning. Take before meals. 90 capsule 3    oxymetazoline (Afrin, oxymetazoline,) 0.05 % nasal spray Administer 2 sprays into each nostril every 12 hours if needed for congestion for up to 2 days. Do not use for more than 3 days. (Patient not taking: Reported on 5/2/2025) 30 mL 0     No current facility-administered medications for this visit.

## 2025-07-23 DIAGNOSIS — K21.9 GASTROESOPHAGEAL REFLUX DISEASE WITHOUT ESOPHAGITIS: ICD-10-CM

## 2025-07-23 RX ORDER — OMEPRAZOLE 40 MG/1
40 CAPSULE, DELAYED RELEASE ORAL
Qty: 90 CAPSULE | Refills: 3 | Status: SHIPPED | OUTPATIENT
Start: 2025-07-23 | End: 2026-07-23

## 2025-07-26 ENCOUNTER — APPOINTMENT (OUTPATIENT)
Dept: RADIOLOGY | Facility: CLINIC | Age: 55
End: 2025-07-26
Payer: COMMERCIAL

## 2025-08-04 ENCOUNTER — OFFICE VISIT (OUTPATIENT)
Dept: PRIMARY CARE | Facility: CLINIC | Age: 55
End: 2025-08-04
Payer: COMMERCIAL

## 2025-08-04 VITALS
TEMPERATURE: 98 F | OXYGEN SATURATION: 97 % | BODY MASS INDEX: 28.01 KG/M2 | HEART RATE: 64 BPM | WEIGHT: 230 LBS | DIASTOLIC BLOOD PRESSURE: 70 MMHG | SYSTOLIC BLOOD PRESSURE: 130 MMHG | HEIGHT: 76 IN | RESPIRATION RATE: 14 BRPM

## 2025-08-04 DIAGNOSIS — Z12.5 SCREENING FOR PROSTATE CANCER: ICD-10-CM

## 2025-08-04 DIAGNOSIS — Z00.00 PERIODIC HEALTH ASSESSMENT, GENERAL SCREENING, ADULT: ICD-10-CM

## 2025-08-04 DIAGNOSIS — J45.20 MILD INTERMITTENT REACTIVE AIRWAY DISEASE WITHOUT COMPLICATION (HHS-HCC): ICD-10-CM

## 2025-08-04 DIAGNOSIS — Z13.220 SCREENING FOR HYPERLIPIDEMIA: ICD-10-CM

## 2025-08-04 DIAGNOSIS — L23.9 ALLERGIC CONTACT DERMATITIS, UNSPECIFIED TRIGGER: Primary | ICD-10-CM

## 2025-08-04 PROBLEM — J45.909 RAD (REACTIVE AIRWAY DISEASE) (HHS-HCC): Status: RESOLVED | Noted: 2023-05-09 | Resolved: 2025-08-04

## 2025-08-04 PROCEDURE — 3008F BODY MASS INDEX DOCD: CPT | Performed by: INTERNAL MEDICINE

## 2025-08-04 PROCEDURE — 99213 OFFICE O/P EST LOW 20 MIN: CPT | Performed by: INTERNAL MEDICINE

## 2025-08-04 RX ORDER — BETAMETHASONE DIPROPIONATE 0.5 MG/G
CREAM TOPICAL 2 TIMES DAILY PRN
Qty: 15 G | Refills: 0 | Status: SHIPPED | OUTPATIENT
Start: 2025-08-04 | End: 2025-12-02

## 2025-08-04 RX ORDER — PREDNISONE 20 MG/1
40 TABLET ORAL DAILY
Qty: 10 TABLET | Refills: 0 | Status: SHIPPED | OUTPATIENT
Start: 2025-08-04 | End: 2025-08-09

## 2025-08-04 ASSESSMENT — PATIENT HEALTH QUESTIONNAIRE - PHQ9
SUM OF ALL RESPONSES TO PHQ9 QUESTIONS 1 AND 2: 0
2. FEELING DOWN, DEPRESSED OR HOPELESS: NOT AT ALL
1. LITTLE INTEREST OR PLEASURE IN DOING THINGS: NOT AT ALL

## 2025-08-04 NOTE — PROGRESS NOTES
"Subjective   Patient ID: Dario Buckley is a 55 y.o. male who presents for Poison Adriana.    Poison Adriana    Dario thinks he may have come in contact with poison ivy or something like that.  He is got pruritic raised red rash on his left wrist hand as well as his left ankle.  No diffuse rash and no wheezing or shortness of breath.    Review of Systems   Skin:  Positive for rash.       Objective   /70 (BP Location: Left arm, Patient Position: Sitting, BP Cuff Size: Adult)   Pulse 64   Temp 36.7 °C (98 °F) (Tympanic)   Resp 14   Ht 1.93 m (6' 4\")   Wt 104 kg (230 lb)   SpO2 97%   BMI 28.00 kg/m²     Physical Exam  Vitals reviewed.   Constitutional:       Appearance: Normal appearance.   HENT:      Head: Normocephalic.     Cardiovascular:      Rate and Rhythm: Normal rate.   Pulmonary:      Effort: Pulmonary effort is normal.     Musculoskeletal:         General: Normal range of motion.     Skin:     Findings: Rash present.     Neurological:      General: No focal deficit present.      Mental Status: He is alert.     Psychiatric:         Mood and Affect: Mood normal.         Assessment/Plan   Problem List Items Addressed This Visit           ICD-10-CM    RESOLVED: RAD (reactive airway disease) ([facility]) J45.909     Other Visit Diagnoses         Codes      Allergic contact dermatitis, unspecified trigger    -  Primary L23.9    Relevant Medications    predniSONE (Deltasone) 20 mg tablet    betamethasone dipropionate 0.05 % cream      Screening for hyperlipidemia     Z13.220    Relevant Orders    Lipid Panel      Screening for prostate cancer     Z12.5    Relevant Orders    Prostate Specific Antigen      Periodic health assessment, general screening, adult     Z00.00    Relevant Orders    Hemoglobin A1C    CBC    Comprehensive Metabolic Panel    Thyroid Stimulating Hormone    Urinalysis with Reflex Microscopic        Contact dermatitis, possibly from poison ivy.  We discussed the above with prednisone as well as " Claritin in the morning and Benadryl at night as well as a topical cream if need be.  Follow-up as needed

## 2025-08-16 ENCOUNTER — HOSPITAL ENCOUNTER (OUTPATIENT)
Dept: CARDIOLOGY | Facility: HOSPITAL | Age: 55
Discharge: HOME | End: 2025-08-16
Payer: COMMERCIAL

## 2025-08-16 DIAGNOSIS — I87.2 VENOUS INSUFFICIENCY: ICD-10-CM

## 2025-08-16 PROCEDURE — 93970 EXTREMITY STUDY: CPT | Performed by: INTERNAL MEDICINE

## 2025-08-16 PROCEDURE — 93970 EXTREMITY STUDY: CPT

## 2025-08-18 ENCOUNTER — RESULTS FOLLOW-UP (OUTPATIENT)
Dept: PODIATRY | Facility: CLINIC | Age: 55
End: 2025-08-18
Payer: COMMERCIAL

## 2025-08-26 ENCOUNTER — TELEPHONE (OUTPATIENT)
Dept: VASCULAR SURGERY | Facility: HOSPITAL | Age: 55
End: 2025-08-26
Payer: COMMERCIAL

## 2025-08-27 ASSESSMENT — PROMIS GLOBAL HEALTH SCALE
RATE_GENERAL_HEALTH: VERY GOOD
CARRYOUT_SOCIAL_ACTIVITIES: EXCELLENT
RATE_AVERAGE_PAIN: 1
RATE_QUALITY_OF_LIFE: EXCELLENT
RATE_PHYSICAL_HEALTH: VERY GOOD
RATE_AVERAGE_FATIGUE: MILD
RATE_MENTAL_HEALTH: VERY GOOD
RATE_SOCIAL_SATISFACTION: EXCELLENT
EMOTIONAL_PROBLEMS: NEVER
CARRYOUT_PHYSICAL_ACTIVITIES: COMPLETELY

## 2025-08-29 ENCOUNTER — APPOINTMENT (OUTPATIENT)
Dept: PRIMARY CARE | Facility: CLINIC | Age: 55
End: 2025-08-29
Payer: COMMERCIAL

## 2025-08-29 ASSESSMENT — ENCOUNTER SYMPTOMS
ABDOMINAL PAIN: 0
COUGH: 0
PALPITATIONS: 0
CONSTIPATION: 0
SHORTNESS OF BREATH: 0
DIARRHEA: 0
WHEEZING: 0
NAUSEA: 0

## 2025-08-29 ASSESSMENT — PATIENT HEALTH QUESTIONNAIRE - PHQ9
1. LITTLE INTEREST OR PLEASURE IN DOING THINGS: NOT AT ALL
2. FEELING DOWN, DEPRESSED OR HOPELESS: NOT AT ALL
SUM OF ALL RESPONSES TO PHQ9 QUESTIONS 1 AND 2: 0

## 2025-08-31 LAB
ALBUMIN SERPL-MCNC: 4.3 G/DL (ref 3.6–5.1)
ALP SERPL-CCNC: 52 U/L (ref 35–144)
ALT SERPL-CCNC: 18 U/L (ref 9–46)
ANION GAP SERPL CALCULATED.4IONS-SCNC: 8 MMOL/L (CALC) (ref 7–17)
APPEARANCE UR: CLEAR
AST SERPL-CCNC: 14 U/L (ref 10–35)
BACTERIA #/AREA URNS HPF: ABNORMAL /HPF
BILIRUB SERPL-MCNC: 1.4 MG/DL (ref 0.2–1.2)
BILIRUB UR QL STRIP: NEGATIVE
BUN SERPL-MCNC: 17 MG/DL (ref 7–25)
CALCIUM SERPL-MCNC: 9 MG/DL (ref 8.6–10.3)
CHLORIDE SERPL-SCNC: 106 MMOL/L (ref 98–110)
CHOLEST SERPL-MCNC: 223 MG/DL
CHOLEST/HDLC SERPL: 5.1 (CALC)
CO2 SERPL-SCNC: 30 MMOL/L (ref 20–32)
COLOR UR: ABNORMAL
CREAT SERPL-MCNC: 1.03 MG/DL (ref 0.7–1.3)
EGFRCR SERPLBLD CKD-EPI 2021: 85 ML/MIN/1.73M2
ERYTHROCYTE [DISTWIDTH] IN BLOOD BY AUTOMATED COUNT: 12.4 % (ref 11–15)
EST. AVERAGE GLUCOSE BLD GHB EST-MCNC: 114 MG/DL
EST. AVERAGE GLUCOSE BLD GHB EST-SCNC: 6.3 MMOL/L
GLUCOSE SERPL-MCNC: 88 MG/DL (ref 65–99)
GLUCOSE UR QL STRIP: NEGATIVE
HBA1C MFR BLD: 5.6 %
HCT VFR BLD AUTO: 44 % (ref 38.5–50)
HDLC SERPL-MCNC: 44 MG/DL
HGB BLD-MCNC: 14.7 G/DL (ref 13.2–17.1)
HGB UR QL STRIP: NEGATIVE
HYALINE CASTS #/AREA URNS LPF: ABNORMAL /LPF
KETONES UR QL STRIP: NEGATIVE
LDLC SERPL CALC-MCNC: 149 MG/DL (CALC)
LEUKOCYTE ESTERASE UR QL STRIP: ABNORMAL
MCH RBC QN AUTO: 34.2 PG (ref 27–33)
MCHC RBC AUTO-ENTMCNC: 33.4 G/DL (ref 32–36)
MCV RBC AUTO: 102.3 FL (ref 80–100)
NITRITE UR QL STRIP: NEGATIVE
NONHDLC SERPL-MCNC: 179 MG/DL (CALC)
PH UR STRIP: 8 [PH] (ref 5–8)
PLATELET # BLD AUTO: 173 THOUSAND/UL (ref 140–400)
PMV BLD REES-ECKER: 11.1 FL (ref 7.5–12.5)
POTASSIUM SERPL-SCNC: 4 MMOL/L (ref 3.5–5.3)
PROT SERPL-MCNC: 7.1 G/DL (ref 6.1–8.1)
PROT UR QL STRIP: ABNORMAL
PSA SERPL-MCNC: 1.75 NG/ML
RBC # BLD AUTO: 4.3 MILLION/UL (ref 4.2–5.8)
RBC #/AREA URNS HPF: ABNORMAL /HPF
SERVICE CMNT-IMP: ABNORMAL
SODIUM SERPL-SCNC: 144 MMOL/L (ref 135–146)
SP GR UR STRIP: 1.02 (ref 1–1.03)
SQUAMOUS #/AREA URNS HPF: ABNORMAL /HPF
TRIGL SERPL-MCNC: 160 MG/DL
TSH SERPL-ACNC: 1.14 MIU/L (ref 0.4–4.5)
WBC # BLD AUTO: 6.7 THOUSAND/UL (ref 3.8–10.8)
WBC #/AREA URNS HPF: ABNORMAL /HPF

## 2025-09-05 ENCOUNTER — OFFICE VISIT (OUTPATIENT)
Dept: OTOLARYNGOLOGY | Facility: CLINIC | Age: 55
End: 2025-09-05
Payer: COMMERCIAL

## 2025-09-05 VITALS
SYSTOLIC BLOOD PRESSURE: 121 MMHG | BODY MASS INDEX: 27.63 KG/M2 | DIASTOLIC BLOOD PRESSURE: 82 MMHG | WEIGHT: 226.9 LBS | HEIGHT: 76 IN

## 2025-09-05 DIAGNOSIS — R09.82 POST-NASAL DRAINAGE: ICD-10-CM

## 2025-09-05 DIAGNOSIS — R09.89 PHLEGM IN THROAT: Primary | ICD-10-CM

## 2025-09-05 DIAGNOSIS — R09.A2 GLOBUS SENSATION: ICD-10-CM

## 2025-09-05 DIAGNOSIS — K21.9 LARYNGOPHARYNGEAL REFLUX (LPR): ICD-10-CM

## 2025-09-05 DIAGNOSIS — Z98.890 HISTORY OF THYROPLASTY: ICD-10-CM

## 2025-09-05 DIAGNOSIS — R09.81 NASAL CONGESTION: ICD-10-CM

## 2025-09-05 PROCEDURE — 3008F BODY MASS INDEX DOCD: CPT

## 2025-09-05 PROCEDURE — 31575 DIAGNOSTIC LARYNGOSCOPY: CPT

## 2025-09-05 PROCEDURE — 1036F TOBACCO NON-USER: CPT

## 2025-09-05 PROCEDURE — 99214 OFFICE O/P EST MOD 30 MIN: CPT

## 2025-09-05 PROCEDURE — 99214 OFFICE O/P EST MOD 30 MIN: CPT | Mod: 25

## 2025-09-05 RX ORDER — AZELASTINE 1 MG/ML
2 SPRAY, METERED NASAL 2 TIMES DAILY
Qty: 30 ML | Refills: 3 | Status: SHIPPED | OUTPATIENT
Start: 2025-09-05 | End: 2026-09-05

## 2025-10-07 ENCOUNTER — APPOINTMENT (OUTPATIENT)
Dept: VASCULAR SURGERY | Facility: CLINIC | Age: 55
End: 2025-10-07
Payer: COMMERCIAL

## 2025-11-20 ENCOUNTER — APPOINTMENT (OUTPATIENT)
Dept: GASTROENTEROLOGY | Facility: EXTERNAL LOCATION | Age: 55
End: 2025-11-20
Payer: COMMERCIAL

## 2026-02-18 ENCOUNTER — APPOINTMENT (OUTPATIENT)
Dept: PRIMARY CARE | Facility: CLINIC | Age: 56
End: 2026-02-18
Payer: COMMERCIAL

## 2026-09-02 ENCOUNTER — APPOINTMENT (OUTPATIENT)
Dept: PRIMARY CARE | Facility: CLINIC | Age: 56
End: 2026-09-02
Payer: COMMERCIAL